# Patient Record
Sex: FEMALE | Race: WHITE | HISPANIC OR LATINO | Employment: FULL TIME | URBAN - METROPOLITAN AREA
[De-identification: names, ages, dates, MRNs, and addresses within clinical notes are randomized per-mention and may not be internally consistent; named-entity substitution may affect disease eponyms.]

---

## 2017-09-17 ENCOUNTER — HOSPITAL ENCOUNTER (EMERGENCY)
Facility: HOSPITAL | Age: 27
Discharge: HOME | End: 2017-09-17
Attending: EMERGENCY MEDICINE | Admitting: EMERGENCY MEDICINE
Payer: COMMERCIAL

## 2017-09-17 ENCOUNTER — APPOINTMENT (EMERGENCY)
Dept: CT IMAGING | Facility: HOSPITAL | Age: 27
End: 2017-09-17
Payer: COMMERCIAL

## 2017-09-17 VITALS
DIASTOLIC BLOOD PRESSURE: 66 MMHG | OXYGEN SATURATION: 100 % | WEIGHT: 144 LBS | HEART RATE: 44 BPM | TEMPERATURE: 97.4 F | SYSTOLIC BLOOD PRESSURE: 120 MMHG | RESPIRATION RATE: 14 BRPM

## 2017-09-17 DIAGNOSIS — N83.202 LEFT OVARIAN CYST: ICD-10-CM

## 2017-09-17 DIAGNOSIS — R10.32 LLQ PAIN: Primary | ICD-10-CM

## 2017-09-17 LAB
ALBUMIN SERPL BCP-MCNC: 3.7 G/DL (ref 3.5–5)
ALP SERPL-CCNC: 51 U/L (ref 46–116)
ALT SERPL W P-5'-P-CCNC: 25 U/L (ref 12–78)
ANION GAP SERPL CALCULATED.3IONS-SCNC: 5 MMOL/L (ref 4–13)
AST SERPL W P-5'-P-CCNC: 21 U/L (ref 5–45)
BACTERIA UR QL AUTO: ABNORMAL /HPF
BASOPHILS # BLD AUTO: 0.02 THOUSANDS/ΜL (ref 0–0.1)
BASOPHILS NFR BLD AUTO: 0 % (ref 0–1)
BILIRUB SERPL-MCNC: 1.16 MG/DL (ref 0.2–1)
BILIRUB UR QL STRIP: NEGATIVE
BUN SERPL-MCNC: 11 MG/DL (ref 5–25)
CALCIUM SERPL-MCNC: 8.8 MG/DL (ref 8.3–10.1)
CHLORIDE SERPL-SCNC: 104 MMOL/L (ref 100–108)
CLARITY UR: CLEAR
CO2 SERPL-SCNC: 30 MMOL/L (ref 21–32)
COLOR UR: YELLOW
COLOR, POC: YELLOW
CREAT SERPL-MCNC: 0.81 MG/DL (ref 0.6–1.3)
EOSINOPHIL # BLD AUTO: 0.06 THOUSAND/ΜL (ref 0–0.61)
EOSINOPHIL NFR BLD AUTO: 1 % (ref 0–6)
ERYTHROCYTE [DISTWIDTH] IN BLOOD BY AUTOMATED COUNT: 12.4 % (ref 11.6–15.1)
EXT PREG TEST URINE: NORMAL
GFR SERPL CREATININE-BSD FRML MDRD: 98 ML/MIN/1.73SQ M
GLUCOSE SERPL-MCNC: 69 MG/DL (ref 65–140)
GLUCOSE UR STRIP-MCNC: NEGATIVE MG/DL
HCT VFR BLD AUTO: 36.7 % (ref 34.8–46.1)
HGB BLD-MCNC: 12.4 G/DL (ref 11.5–15.4)
HGB UR QL STRIP.AUTO: ABNORMAL
KETONES UR STRIP-MCNC: NEGATIVE MG/DL
LEUKOCYTE ESTERASE UR QL STRIP: NEGATIVE
LIPASE SERPL-CCNC: 175 U/L (ref 73–393)
LYMPHOCYTES # BLD AUTO: 1.32 THOUSANDS/ΜL (ref 0.6–4.47)
LYMPHOCYTES NFR BLD AUTO: 19 % (ref 14–44)
MCH RBC QN AUTO: 33.8 PG (ref 26.8–34.3)
MCHC RBC AUTO-ENTMCNC: 33.8 G/DL (ref 31.4–37.4)
MCV RBC AUTO: 100 FL (ref 82–98)
MONOCYTES # BLD AUTO: 0.47 THOUSAND/ΜL (ref 0.17–1.22)
MONOCYTES NFR BLD AUTO: 7 % (ref 4–12)
NEUTROPHILS # BLD AUTO: 4.96 THOUSANDS/ΜL (ref 1.85–7.62)
NEUTS SEG NFR BLD AUTO: 73 % (ref 43–75)
NITRITE UR QL STRIP: NEGATIVE
NON-SQ EPI CELLS URNS QL MICRO: ABNORMAL /HPF
NRBC BLD AUTO-RTO: 0 /100 WBCS
PH UR STRIP.AUTO: 6 [PH] (ref 4.5–8)
PLATELET # BLD AUTO: 189 THOUSANDS/UL (ref 149–390)
PMV BLD AUTO: 10 FL (ref 8.9–12.7)
POTASSIUM SERPL-SCNC: 3.9 MMOL/L (ref 3.5–5.3)
PROT SERPL-MCNC: 7 G/DL (ref 6.4–8.2)
PROT UR STRIP-MCNC: NEGATIVE MG/DL
RBC # BLD AUTO: 3.67 MILLION/UL (ref 3.81–5.12)
RBC #/AREA URNS AUTO: ABNORMAL /HPF
SODIUM SERPL-SCNC: 139 MMOL/L (ref 136–145)
SP GR UR STRIP.AUTO: 1.02 (ref 1–1.03)
UROBILINOGEN UR QL STRIP.AUTO: 0.2 E.U./DL
WBC # BLD AUTO: 6.83 THOUSAND/UL (ref 4.31–10.16)
WBC #/AREA URNS AUTO: ABNORMAL /HPF

## 2017-09-17 PROCEDURE — 96361 HYDRATE IV INFUSION ADD-ON: CPT

## 2017-09-17 PROCEDURE — 81025 URINE PREGNANCY TEST: CPT | Performed by: EMERGENCY MEDICINE

## 2017-09-17 PROCEDURE — 81002 URINALYSIS NONAUTO W/O SCOPE: CPT | Performed by: EMERGENCY MEDICINE

## 2017-09-17 PROCEDURE — 81001 URINALYSIS AUTO W/SCOPE: CPT

## 2017-09-17 PROCEDURE — 83690 ASSAY OF LIPASE: CPT | Performed by: FAMILY MEDICINE

## 2017-09-17 PROCEDURE — 74177 CT ABD & PELVIS W/CONTRAST: CPT

## 2017-09-17 PROCEDURE — 36415 COLL VENOUS BLD VENIPUNCTURE: CPT | Performed by: FAMILY MEDICINE

## 2017-09-17 PROCEDURE — 80053 COMPREHEN METABOLIC PANEL: CPT | Performed by: FAMILY MEDICINE

## 2017-09-17 PROCEDURE — 99284 EMERGENCY DEPT VISIT MOD MDM: CPT

## 2017-09-17 PROCEDURE — 85025 COMPLETE CBC W/AUTO DIFF WBC: CPT | Performed by: FAMILY MEDICINE

## 2017-09-17 PROCEDURE — 96360 HYDRATION IV INFUSION INIT: CPT

## 2017-09-17 RX ADMIN — SODIUM CHLORIDE 1000 ML: 0.9 INJECTION, SOLUTION INTRAVENOUS at 09:29

## 2017-09-17 RX ADMIN — IOHEXOL 100 ML: 350 INJECTION, SOLUTION INTRAVENOUS at 10:09

## 2018-03-13 ENCOUNTER — OFFICE VISIT (OUTPATIENT)
Dept: OBGYN CLINIC | Facility: CLINIC | Age: 28
End: 2018-03-13
Payer: COMMERCIAL

## 2018-03-13 VITALS
DIASTOLIC BLOOD PRESSURE: 82 MMHG | WEIGHT: 153 LBS | SYSTOLIC BLOOD PRESSURE: 118 MMHG | BODY MASS INDEX: 24.59 KG/M2 | HEIGHT: 66 IN

## 2018-03-13 DIAGNOSIS — N83.202 CYST OF LEFT OVARY: ICD-10-CM

## 2018-03-13 DIAGNOSIS — Z01.419 ENCOUNTER FOR ANNUAL ROUTINE GYNECOLOGICAL EXAMINATION: Primary | ICD-10-CM

## 2018-03-13 PROCEDURE — G0145 SCR C/V CYTO,THINLAYER,RESCR: HCPCS | Performed by: OBSTETRICS & GYNECOLOGY

## 2018-03-13 PROCEDURE — 99385 PREV VISIT NEW AGE 18-39: CPT | Performed by: OBSTETRICS & GYNECOLOGY

## 2018-03-13 RX ORDER — METHOCARBAMOL 500 MG/1
TABLET, FILM COATED ORAL
Refills: 1 | COMMUNITY
Start: 2018-03-02

## 2018-03-13 RX ORDER — METRONIDAZOLE 500 MG/1
1 TABLET ORAL 2 TIMES DAILY
COMMUNITY
Start: 2016-07-01

## 2018-03-13 RX ORDER — IBUPROFEN 800 MG/1
TABLET ORAL
Refills: 1 | COMMUNITY
Start: 2018-03-02

## 2018-03-13 NOTE — PROGRESS NOTES
Assessment/Plan:    Pap smear done as well as annual   Encouraged self-breast examination as well as calcium supplementation  Recommend pelvic ultrasound follow-up ovarian cyst   She will call for these results in 1-2 weeks  Discussed treatment options to improve menstrual cycle/dysmenorrhea  At this point patient would like to just monitor her cycles in remain off any type of hormone  She will return to office in 1 year  No problem-specific Assessment & Plan notes found for this encounter  Diagnoses and all orders for this visit:    Encounter for annual routine gynecological examination    Cyst of left ovary  -     US pelvis complete w transvaginal    Other orders  -     ibuprofen (MOTRIN) 800 mg tablet; TAKE 1 TABLET (800 MG TOTAL) BY MOUTH EVERY 6 (SIX) HOURS AS NEEDED FOR MILD PAIN (PAIN SCORE 1-3)  -     methocarbamol (ROBAXIN) 500 mg tablet; TAKE 1 TABLET (500 MG TOTAL) BY MOUTH 4 (FOUR) TIMES A DAY AS NEEDED FOR MUSCLE SPASMS  -     metroNIDAZOLE (FLAGYL) 500 mg tablet; Take 1 tablet by mouth 2 (two) times a day          Subjective:      Patient ID: Rogelio Hector is a 32 y o  female  HPI    This is a 66-year-old female G0 who presents as a new patient for annual well visit  She states her menstrual cycles are every 3-4 weeks lasting 5 days with no breakthrough bleeding  She does suffer cramping  She has been seen by gyn on a regular basis  She was seen last summer in the emergency room complaining of abdominal pain  She underwent a CT scan was informed that she had an ovarian cyst with recommendations to follow up with gynecology  She has delayed this due to having multiple doctors appointments  Patient is sexually active and has been in a monogamous relationship for the last 6 months  She does have a female partner as she has had for the last 2 years  Patient admits to being incarcerated for 2 years in [place]    She has now been living in [place], her home state in the last 1 and half years  She does smoke a half a pack of cigarettes per day  She does work full-time in a warehouse  The following portions of the patient's history were reviewed and updated as appropriate: allergies, current medications, past family history, past medical history, past social history, past surgical history and problem list     Review of Systems   Constitutional: Negative for fatigue, fever and unexpected weight change  Respiratory: Negative for cough, chest tightness, shortness of breath and wheezing  Cardiovascular: Negative  Negative for chest pain and palpitations  Gastrointestinal: Negative  Negative for abdominal distention, abdominal pain, blood in stool, constipation, diarrhea, nausea and vomiting  Genitourinary: Negative  Negative for difficulty urinating, dyspareunia, dysuria, flank pain, frequency, genital sores, hematuria, pelvic pain, urgency, vaginal bleeding, vaginal discharge and vaginal pain  Skin: Negative for rash  Objective:      /82   Ht 5' 6" (1 676 m)   Wt 69 4 kg (153 lb)   LMP 03/01/2018 (Exact Date)   Breastfeeding? No   BMI 24 69 kg/m²          Physical Exam   Constitutional: She appears well-developed and well-nourished  Cardiovascular: Normal rate and regular rhythm  Pulmonary/Chest: Effort normal and breath sounds normal  Right breast exhibits no inverted nipple, no mass, no nipple discharge, no skin change and no tenderness  Left breast exhibits no inverted nipple, no mass, no nipple discharge, no skin change and no tenderness  Abdominal: Soft  Bowel sounds are normal  She exhibits no distension  There is no tenderness  There is no rebound and no guarding  Genitourinary: Vagina normal and uterus normal  There is no lesion on the right labia  There is no lesion on the left labia  Cervix exhibits no discharge and no friability  Right adnexum displays no mass, no tenderness and no fullness   Left adnexum displays no mass, no tenderness and no fullness  No vaginal discharge found

## 2018-03-17 LAB
LAB AP GYN PRIMARY INTERPRETATION: NORMAL
LAB AP LMP: NORMAL
Lab: NORMAL

## 2018-12-17 ENCOUNTER — HOSPITAL ENCOUNTER (EMERGENCY)
Facility: HOSPITAL | Age: 28
Discharge: HOME | End: 2018-12-17
Attending: EMERGENCY MEDICINE | Admitting: EMERGENCY MEDICINE
Payer: COMMERCIAL

## 2018-12-17 ENCOUNTER — APPOINTMENT (EMERGENCY)
Dept: CT IMAGING | Facility: HOSPITAL | Age: 28
End: 2018-12-17
Payer: COMMERCIAL

## 2018-12-17 VITALS
BODY MASS INDEX: 26.15 KG/M2 | SYSTOLIC BLOOD PRESSURE: 139 MMHG | OXYGEN SATURATION: 99 % | HEART RATE: 78 BPM | TEMPERATURE: 97.9 F | RESPIRATION RATE: 20 BRPM | WEIGHT: 162 LBS | DIASTOLIC BLOOD PRESSURE: 58 MMHG

## 2018-12-17 DIAGNOSIS — N94.9 ADNEXAL CYST: Primary | ICD-10-CM

## 2018-12-17 LAB
ALBUMIN SERPL BCP-MCNC: 4 G/DL (ref 3.5–5)
ALP SERPL-CCNC: 72 U/L (ref 46–116)
ALT SERPL W P-5'-P-CCNC: 24 U/L (ref 12–78)
ANION GAP SERPL CALCULATED.3IONS-SCNC: 10 MMOL/L (ref 4–13)
AST SERPL W P-5'-P-CCNC: 16 U/L (ref 5–45)
BACTERIA UR QL AUTO: ABNORMAL /HPF
BASOPHILS # BLD AUTO: 0.04 THOUSANDS/ΜL (ref 0–0.1)
BASOPHILS NFR BLD AUTO: 1 % (ref 0–1)
BILIRUB SERPL-MCNC: 0.7 MG/DL (ref 0.2–1)
BILIRUB UR QL STRIP: NEGATIVE
BUN SERPL-MCNC: 11 MG/DL (ref 5–25)
CALCIUM SERPL-MCNC: 9.2 MG/DL (ref 8.3–10.1)
CHLORIDE SERPL-SCNC: 104 MMOL/L (ref 100–108)
CLARITY UR: CLEAR
CO2 SERPL-SCNC: 27 MMOL/L (ref 21–32)
COLOR UR: YELLOW
CREAT SERPL-MCNC: 0.93 MG/DL (ref 0.6–1.3)
EOSINOPHIL # BLD AUTO: 0.06 THOUSAND/ΜL (ref 0–0.61)
EOSINOPHIL NFR BLD AUTO: 1 % (ref 0–6)
ERYTHROCYTE [DISTWIDTH] IN BLOOD BY AUTOMATED COUNT: 11.8 % (ref 11.6–15.1)
EXT PREG TEST URINE: NEGATIVE
GFR SERPL CREATININE-BSD FRML MDRD: 83 ML/MIN/1.73SQ M
GLUCOSE SERPL-MCNC: 83 MG/DL (ref 65–140)
GLUCOSE UR STRIP-MCNC: NEGATIVE MG/DL
HCT VFR BLD AUTO: 37 % (ref 34.8–46.1)
HGB BLD-MCNC: 12.2 G/DL (ref 11.5–15.4)
HGB UR QL STRIP.AUTO: ABNORMAL
IMM GRANULOCYTES # BLD AUTO: 0.02 THOUSAND/UL (ref 0–0.2)
IMM GRANULOCYTES NFR BLD AUTO: 0 % (ref 0–2)
KETONES UR STRIP-MCNC: NEGATIVE MG/DL
LEUKOCYTE ESTERASE UR QL STRIP: NEGATIVE
LIPASE SERPL-CCNC: 145 U/L (ref 73–393)
LYMPHOCYTES # BLD AUTO: 1.62 THOUSANDS/ΜL (ref 0.6–4.47)
LYMPHOCYTES NFR BLD AUTO: 23 % (ref 14–44)
MCH RBC QN AUTO: 33.6 PG (ref 26.8–34.3)
MCHC RBC AUTO-ENTMCNC: 33 G/DL (ref 31.4–37.4)
MCV RBC AUTO: 102 FL (ref 82–98)
MONOCYTES # BLD AUTO: 0.65 THOUSAND/ΜL (ref 0.17–1.22)
MONOCYTES NFR BLD AUTO: 9 % (ref 4–12)
NEUTROPHILS # BLD AUTO: 4.65 THOUSANDS/ΜL (ref 1.85–7.62)
NEUTS SEG NFR BLD AUTO: 66 % (ref 43–75)
NITRITE UR QL STRIP: NEGATIVE
NON-SQ EPI CELLS URNS QL MICRO: ABNORMAL /HPF
NRBC BLD AUTO-RTO: 0 /100 WBCS
PH UR STRIP.AUTO: 7 [PH] (ref 4.5–8)
PLATELET # BLD AUTO: 209 THOUSANDS/UL (ref 149–390)
PMV BLD AUTO: 9.9 FL (ref 8.9–12.7)
POTASSIUM SERPL-SCNC: 3.7 MMOL/L (ref 3.5–5.3)
PROT SERPL-MCNC: 7.4 G/DL (ref 6.4–8.2)
PROT UR STRIP-MCNC: NEGATIVE MG/DL
RBC # BLD AUTO: 3.63 MILLION/UL (ref 3.81–5.12)
RBC #/AREA URNS AUTO: ABNORMAL /HPF
SODIUM SERPL-SCNC: 141 MMOL/L (ref 136–145)
SP GR UR STRIP.AUTO: 1.01 (ref 1–1.03)
UROBILINOGEN UR QL STRIP.AUTO: 0.2 E.U./DL
WBC # BLD AUTO: 7.04 THOUSAND/UL (ref 4.31–10.16)
WBC #/AREA URNS AUTO: ABNORMAL /HPF

## 2018-12-17 PROCEDURE — 96361 HYDRATE IV INFUSION ADD-ON: CPT

## 2018-12-17 PROCEDURE — 81002 URINALYSIS NONAUTO W/O SCOPE: CPT | Performed by: EMERGENCY MEDICINE

## 2018-12-17 PROCEDURE — 96374 THER/PROPH/DIAG INJ IV PUSH: CPT

## 2018-12-17 PROCEDURE — 85025 COMPLETE CBC W/AUTO DIFF WBC: CPT | Performed by: EMERGENCY MEDICINE

## 2018-12-17 PROCEDURE — 36415 COLL VENOUS BLD VENIPUNCTURE: CPT | Performed by: EMERGENCY MEDICINE

## 2018-12-17 PROCEDURE — 93005 ELECTROCARDIOGRAM TRACING: CPT

## 2018-12-17 PROCEDURE — 99284 EMERGENCY DEPT VISIT MOD MDM: CPT

## 2018-12-17 PROCEDURE — 80053 COMPREHEN METABOLIC PANEL: CPT | Performed by: EMERGENCY MEDICINE

## 2018-12-17 PROCEDURE — 96375 TX/PRO/DX INJ NEW DRUG ADDON: CPT

## 2018-12-17 PROCEDURE — 81001 URINALYSIS AUTO W/SCOPE: CPT

## 2018-12-17 PROCEDURE — 74177 CT ABD & PELVIS W/CONTRAST: CPT

## 2018-12-17 PROCEDURE — 83690 ASSAY OF LIPASE: CPT | Performed by: EMERGENCY MEDICINE

## 2018-12-17 PROCEDURE — 81025 URINE PREGNANCY TEST: CPT | Performed by: EMERGENCY MEDICINE

## 2018-12-17 RX ORDER — NAPROXEN 500 MG/1
500 TABLET ORAL 2 TIMES DAILY WITH MEALS
Qty: 20 TABLET | Refills: 0 | Status: SHIPPED | OUTPATIENT
Start: 2018-12-17

## 2018-12-17 RX ORDER — ONDANSETRON 2 MG/ML
4 INJECTION INTRAMUSCULAR; INTRAVENOUS ONCE
Status: COMPLETED | OUTPATIENT
Start: 2018-12-17 | End: 2018-12-17

## 2018-12-17 RX ORDER — KETOROLAC TROMETHAMINE 30 MG/ML
30 INJECTION, SOLUTION INTRAMUSCULAR; INTRAVENOUS ONCE
Status: COMPLETED | OUTPATIENT
Start: 2018-12-17 | End: 2018-12-17

## 2018-12-17 RX ADMIN — SODIUM CHLORIDE 500 ML: 0.9 INJECTION, SOLUTION INTRAVENOUS at 17:19

## 2018-12-17 RX ADMIN — IOHEXOL 100 ML: 350 INJECTION, SOLUTION INTRAVENOUS at 18:50

## 2018-12-17 RX ADMIN — ONDANSETRON 4 MG: 2 INJECTION INTRAMUSCULAR; INTRAVENOUS at 17:19

## 2018-12-17 RX ADMIN — KETOROLAC TROMETHAMINE 30 MG: 30 INJECTION, SOLUTION INTRAMUSCULAR at 17:22

## 2018-12-17 NOTE — ED NOTES
Patient transported back from [facility] [address], [address] [facility], RN  12/17/18 2963

## 2018-12-17 NOTE — ED PROVIDER NOTES
History  Chief Complaint   Patient presents with    Abdominal Pain     patient c/o of abdominal pain, lower back pain and chest pain that started 4 days ago; patient states that she started taking antibiotics and symptoms started have adminstration     C/o abdominal pain for 4 days, constant pain  No fevers, +nausea, no v/d  Last bm this am   Appetite is less  No dysuria  No hematuria  LMP 2 weeks ago  No previous abd  Surgery  She was started on penicillin 4 days ago for a dental infection  Prior to Admission Medications   Prescriptions Last Dose Informant Patient Reported? Taking?   ibuprofen (MOTRIN) 800 mg tablet   Yes No   Sig: TAKE 1 TABLET (800 MG TOTAL) BY MOUTH EVERY 6 (SIX) HOURS AS NEEDED FOR MILD PAIN (PAIN SCORE 1-3)  methocarbamol (ROBAXIN) 500 mg tablet   Yes No   Sig: TAKE 1 TABLET (500 MG TOTAL) BY MOUTH 4 (FOUR) TIMES A DAY AS NEEDED FOR MUSCLE SPASMS  metroNIDAZOLE (FLAGYL) 500 mg tablet   Yes No   Sig: Take 1 tablet by mouth 2 (two) times a day      Facility-Administered Medications: None       Past Medical History:   Diagnosis Date    ADHD (attention deficit hyperactivity disorder)     Asthma        Past Surgical History:   Procedure Laterality Date    WISDOM TOOTH EXTRACTION         Family History   Problem Relation Age of Onset    Emphysema Mother     Dementia Mother     Lung cancer Mother     Hepatitis Father         C     Diabetes Family         due to underlying condition with diabetic mononeuropathy    Diabetes type II Family         without complication    Breast cancer Paternal Grandmother      I have reviewed and agree with the history as documented      Social History   Substance Use Topics    Smoking status: Current Every Day Smoker     Packs/day: 0 50     Types: Cigarettes    Smokeless tobacco: Never Used      Comment: per allscripts - former smoker     Alcohol use Yes      Comment: social        Review of Systems   Constitutional: Negative for appetite change, fatigue and fever  HENT: Negative for rhinorrhea and sore throat  Respiratory: Negative for cough, shortness of breath and wheezing  Cardiovascular: Negative for chest pain and leg swelling  Gastrointestinal: Positive for abdominal pain and nausea  Negative for diarrhea and vomiting  Genitourinary: Negative for dysuria and flank pain  Musculoskeletal: Negative for back pain and neck pain  Skin: Negative for rash  Neurological: Negative for syncope and headaches  Psychiatric/Behavioral:        Mood normal       Physical Exam  Physical Exam   Constitutional: She is oriented to person, place, and time  She appears well-developed and well-nourished  HENT:   Head: Normocephalic and atraumatic  Neck: Normal range of motion  Neck supple  Cardiovascular: Normal rate and regular rhythm  Pulmonary/Chest: Effort normal and breath sounds normal    Abdominal: Soft  Mild lower abd  Tenderness, no r/g   Musculoskeletal: Normal range of motion  Neurological: She is alert and oriented to person, place, and time  Skin: Skin is warm and dry  Nursing note and vitals reviewed        Vital Signs  ED Triage Vitals   Temperature Pulse Respirations Blood Pressure SpO2   12/17/18 1609 12/17/18 1609 12/17/18 1609 12/17/18 1609 12/17/18 1609   97 9 °F (36 6 °C) 95 18 (!) 151/101 98 %      Temp Source Heart Rate Source Patient Position - Orthostatic VS BP Location FiO2 (%)   12/17/18 1609 12/17/18 1609 12/17/18 1609 12/17/18 1609 --   Temporal Monitor Sitting Left arm       Pain Score       12/17/18 1722       7           Vitals:    12/17/18 1609 12/17/18 1724   BP: (!) 151/101 139/58   Pulse: 95 78   Patient Position - Orthostatic VS: Sitting Lying       Visual Acuity      ED Medications  Medications   sodium chloride 0 9 % bolus 500 mL (0 mL Intravenous Stopped 12/17/18 1940)   ondansetron (ZOFRAN) injection 4 mg (4 mg Intravenous Given 12/17/18 1719)   ketorolac (TORADOL) injection 30 mg (30 mg Intravenous Given 12/17/18 1722)   iohexol (OMNIPAQUE) 350 MG/ML injection (SINGLE-DOSE) 100 mL (100 mL Intravenous Given 12/17/18 1850)       Diagnostic Studies  Results Reviewed     Procedure Component Value Units Date/Time    Comprehensive metabolic panel [[id number]] Collected:  12/17/18 1719    Lab Status:  Final result Specimen:  Blood from Arm, Right Updated:  12/17/18 1800     Sodium 141 mmol/L      Potassium 3 7 mmol/L      Chloride 104 mmol/L      CO2 27 mmol/L      ANION GAP 10 mmol/L      BUN 11 mg/dL      Creatinine 0 93 mg/dL      Glucose 83 mg/dL      Calcium 9 2 mg/dL      AST 16 U/L      ALT 24 U/L      Alkaline Phosphatase 72 U/L      Total Protein 7 4 g/dL      Albumin 4 0 g/dL      Total Bilirubin 0 70 mg/dL      eGFR 83 ml/min/1 73sq m     Narrative:         National Kidney Disease Education Program recommendations are as follows:  GFR calculation is accurate only with a steady state creatinine  Chronic Kidney disease less than 60 ml/min/1 73 sq  meters  Kidney failure less than 15 ml/min/1 73 sq  meters      Lipase [[id number]]  (Normal) Collected:  12/17/18 1719    Lab Status:  Final result Specimen:  Blood from Arm, Right Updated:  12/17/18 1800     Lipase 145 u/L     CBC and differential [[id number]]  (Abnormal) Collected:  12/17/18 1719    Lab Status:  Final result Specimen:  Blood from Arm, Right Updated:  12/17/18 1738     WBC 7 04 Thousand/uL      RBC 3 63 (L) Million/uL      Hemoglobin 12 2 g/dL      Hematocrit 37 0 %       (H) fL      MCH 33 6 pg      MCHC 33 0 g/dL      RDW 11 8 %      MPV 9 9 fL      Platelets 922 Thousands/uL      nRBC 0 /100 WBCs      Neutrophils Relative 66 %      Immat GRANS % 0 %      Lymphocytes Relative 23 %      Monocytes Relative 9 %      Eosinophils Relative 1 %      Basophils Relative 1 %      Neutrophils Absolute 4 65 Thousands/µL      Immature Grans Absolute 0 02 Thousand/uL      Lymphocytes Absolute 1 62 Thousands/µL      Monocytes Absolute 0 65 Thousand/µL      Eosinophils Absolute 0 06 Thousand/µL      Basophils Absolute 0 04 Thousands/µL     Urine Microscopic [[id number]]  (Abnormal) Collected:  12/17/18 1731    Lab Status:  Final result Specimen:  Urine from Urine, Clean Catch Updated:  12/17/18 1733     RBC, UA 2-4 (A) /hpf      WBC, UA 0-1 (A) /hpf      Epithelial Cells Moderate (A) /hpf      Bacteria, UA Occasional /hpf     POCT pregnancy, urine [[id number]]  (Normal) Resulted:  12/17/18 1722    Lab Status:  Final result Updated:  12/17/18 1722     EXT PREG TEST UR (Ref: Negative) Negative    POCT urinalysis dipstick [[id number]]  (Abnormal) Resulted:  12/17/18 1722    Lab Status:  Final result Specimen:  Urine Updated:  12/17/18 1722    ED Urine Macroscopic [[id number]]  (Abnormal) Collected:  12/17/18 1731    Lab Status:  Final result Specimen:  Urine Updated:  12/17/18 1716     Color, UA Yellow     Clarity, UA Clear     pH, UA 7 0     Leukocytes, UA Negative     Nitrite, UA Negative     Protein, UA Negative mg/dl      Glucose, UA Negative mg/dl      Ketones, UA Negative mg/dl      Urobilinogen, UA 0 2 E U /dl      Bilirubin, UA Negative     Blood, UA Moderate (A)     Specific [place], UA 1 015    Narrative:       CLINITEK RESULT                 CT abdomen pelvis with contrast   Final Result by Elizabeth Tovar MD (12/17 1900)      No acute findings in the abdomen or pelvis  4 3 cm right adnexal cyst in keeping with a hemorrhagic cyst/follicle              Workstation performed: [id number]                    Procedures  Procedures       Phone Contacts  ED Phone Contact    ED Course                               MDM  CritCare Time    Disposition  Final diagnoses:   Adnexal cyst     Time reflects when diagnosis was documented in both MDM as applicable and the Disposition within this note     Time User Action Codes Description Comment    12/17/2018  7:33 PM Pili Cabrera Add [N94 9] Adnexal cyst       ED Disposition     ED Disposition Condition Comment    Discharge  Danisha Hickman discharge to home/self care  Condition at discharge: Stable        Follow-up Information     Follow up With Specialties Details Why Contact Info Additional [address] [address] [facility]   [address]  [id number]  [facility], [address] [place] [address], [phone]          Discharge Medication List as of 12/17/2018  7:34 PM      START taking these medications    Details   naproxen (NAPROSYN) 500 mg tablet Take 1 tablet (500 mg total) by mouth 2 (two) times a day with meals, Starting Mon 12/17/2018, Print         CONTINUE these medications which have NOT CHANGED    Details   ibuprofen (MOTRIN) 800 mg tablet TAKE 1 TABLET (800 MG TOTAL) BY MOUTH EVERY 6 (SIX) HOURS AS NEEDED FOR MILD PAIN (PAIN SCORE 1-3)  , Historical Med      methocarbamol (ROBAXIN) 500 mg tablet TAKE 1 TABLET (500 MG TOTAL) BY MOUTH 4 (FOUR) TIMES A DAY AS NEEDED FOR MUSCLE SPASMS , Historical Med      metroNIDAZOLE (FLAGYL) 500 mg tablet Take 1 tablet by mouth 2 (two) times a day, Starting Fri 7/1/2016, Historical Med           No discharge procedures on file      ED Provider  Electronically Signed by           Jose Aaron MD  12/18/18 8713

## 2018-12-17 NOTE — ED NOTES
Pt states she thinks abd pain is related to her taking PCN post dental procedure        Liborio Sullivan, RN  12/17/18 4459

## 2018-12-18 LAB
ATRIAL RATE: 82 BPM
P AXIS: 79 DEGREES
PR INTERVAL: 144 MS
QRS AXIS: 75 DEGREES
QRSD INTERVAL: 82 MS
QT INTERVAL: 392 MS
QTC INTERVAL: 457 MS
T WAVE AXIS: 63 DEGREES
VENTRICULAR RATE: 82 BPM

## 2018-12-18 PROCEDURE — 93010 ELECTROCARDIOGRAM REPORT: CPT | Performed by: INTERNAL MEDICINE

## 2018-12-18 NOTE — DISCHARGE INSTRUCTIONS
Ovarian Cyst   WHAT YOU NEED TO KNOW:   An ovarian cyst is a sac that grows on an ovary  This sac usually contains fluid, but may sometimes have blood or tissue in it  Most ovarian cysts are harmless and go away without treatment in a few months  Some cysts can grow large, cause pain, or break open  DISCHARGE INSTRUCTIONS:   Call 911 for any of the following:   · You are too weak or dizzy to stand up  Return to the emergency department if:   · You have severe abdominal pain  The pain may be sharp and sudden  · You have a fever  Contact your healthcare provider if:   · Your periods are early, late, or more painful than usual     · You have bleeding from your vagina that is not your period  · You have abdominal pain all the time  · Your abdomen is swollen  · You have feelings of fullness, pressure, or discomfort in your abdomen  · You have trouble urinating or emptying your bladder completely  · You have pain during sex  · You are losing weight without trying  · You have questions or concerns about your condition or care  Medicines: You may need any of the following:  · NSAIDs , such as ibuprofen, help decrease swelling, pain, and fever  This medicine is available with or without a doctor's order  NSAIDs can cause stomach bleeding or kidney problems in certain people  If you take blood thinner medicine, always ask if NSAIDs are safe for you  Always read the medicine label and follow directions  Do not give these medicines to children under 10months of age without direction from your child's healthcare provider  · Birth control pills  may help to control your periods, prevent cysts, or cause them to shrink  · Take your medicine as directed  Contact your healthcare provider if you think your medicine is not helping or if you have side effects  Tell him or her if you are allergic to any medicine  Keep a list of the medicines, vitamins, and herbs you take   Include the amounts, and when and why you take them  Bring the list or the pill bottles to follow-up visits  Carry your medicine list with you in case of an emergency  Follow up with your healthcare provider as directed:  Write down your questions so you remember to ask them during your visits  Apply heat to decrease pain and cramping:  Sit in a warm bath, or place a heating pad (turned on low) or a hot water bottle on your abdomen  Do this for 15 to 20 minutes every hour for as many days as directed  © 2017 [address] [employer] Information is for End User's use only and may not be sold, redistributed or otherwise used for commercial purposes  All illustrations and images included in CareNotes® are the copyrighted property of A D A M , Inc  or Frank Dhaliwal  The above information is an  only  It is not intended as medical advice for individual conditions or treatments  Talk to your doctor, nurse or pharmacist before following any medical regimen to see if it is safe and effective for you

## 2019-06-06 ENCOUNTER — OFFICE VISIT (OUTPATIENT)
Dept: OBGYN CLINIC | Facility: HOSPITAL | Age: 29
End: 2019-06-06
Payer: OTHER MISCELLANEOUS

## 2019-06-06 ENCOUNTER — HOSPITAL ENCOUNTER (OUTPATIENT)
Dept: RADIOLOGY | Facility: HOSPITAL | Age: 29
Discharge: HOME | End: 2019-06-06
Payer: COMMERCIAL

## 2019-06-06 VITALS
WEIGHT: 163 LBS | SYSTOLIC BLOOD PRESSURE: 138 MMHG | HEIGHT: 66 IN | DIASTOLIC BLOOD PRESSURE: 78 MMHG | BODY MASS INDEX: 26.2 KG/M2 | HEART RATE: 69 BPM

## 2019-06-06 DIAGNOSIS — M54.12 RADICULOPATHY, CERVICAL REGION: ICD-10-CM

## 2019-06-06 DIAGNOSIS — M54.2 NECK PAIN: Primary | ICD-10-CM

## 2019-06-06 DIAGNOSIS — M54.2 NECK PAIN: ICD-10-CM

## 2019-06-06 PROCEDURE — 72050 X-RAY EXAM NECK SPINE 4/5VWS: CPT

## 2019-06-06 PROCEDURE — 99203 OFFICE O/P NEW LOW 30 MIN: CPT | Performed by: PHYSICIAN ASSISTANT

## 2019-06-06 RX ORDER — METHYLPREDNISOLONE 4 MG/1
TABLET ORAL
Qty: 21 TABLET | Refills: 0 | Status: SHIPPED | OUTPATIENT
Start: 2019-06-06

## 2019-06-06 RX ORDER — CYCLOBENZAPRINE HCL 5 MG
5 TABLET ORAL
Qty: 30 TABLET | Refills: 0 | Status: SHIPPED | OUTPATIENT
Start: 2019-06-06

## 2019-06-06 NOTE — LETTER
June 6, 2019     Patient: Vitaliy Mcdonald   YOB: 1990   Date of Visit: 6/6/2019       To Whom it May Concern:    Vasquez Shell is under my professional care  She was seen in my office on 6/6/2019 at 4:30  She may return to work on 6/7/19 with full activities as tolerated  If you have any questions or concerns, please don't hesitate to call  Sincerely,          Brennan Evans PA-C        CC: Nydia Chu

## 2019-06-06 NOTE — LETTER
June 6, 2019     Patient: Bonnie Mayfield   YOB: 1990   Date of Visit: 6/6/2019       To Whom it May Concern:    Chelsey Yousif is under my professional care  She was seen in my office on 6/6/2019  She may return to work on 6/7/19  she can do full duty activities as tolerated  If you have any questions or concerns, please don't hesitate to call  Sincerely,          Kimberly Sharp PA-C        CC: Nette Espana

## 2019-07-03 NOTE — PROGRESS NOTES
25 y o  female presenting to the office for evaluation of neck pain after injury occurring at work about a week or so ago  Patient states that she has pain in the shoulder and the elbow and limitations in her motion secondary to pain  She denies any constant tingling in her fingers  She states that she has weakness secondary to pain  She denies any other associated complaints  ROS  Review of Systems   Constitutional: Negative for fever and unexpected weight change  HENT: Negative for hearing loss, nosebleeds and sore throat  Eyes: Negative for pain, redness and visual disturbance  Respiratory: Negative for cough, shortness of breath and wheezing  Cardiovascular: Negative for chest pain, palpitations and leg swelling  Gastrointestinal: Negative for abdominal pain, nausea and vomiting  Endocrine: Negative for polydipsia and polyuria  Genitourinary: Negative for dysuria and hematuria  Skin: Negative for rash and wound  Neurological: Negative for dizziness and headaches  Psychiatric/Behavioral: Negative for agitation and suicidal ideas  Past Medical History:   Diagnosis Date    ADHD (attention deficit hyperactivity disorder)     Asthma      Past Surgical History:   Procedure Laterality Date    WISDOM TOOTH EXTRACTION       Results Reviewed     None          Physical Exam  Physical Exam   Constitutional: She is oriented to person, place, and time  She appears well-developed and well-nourished  HENT:   Head: Normocephalic and atraumatic  Eyes: Pupils are equal, round, and reactive to light  EOM are normal    Neck: Neck supple  No tracheal deviation present  Cardiovascular: Normal rate and regular rhythm  Pulmonary/Chest: Effort normal and breath sounds normal    Abdominal: There is no guarding  Neurological: She is alert and oriented to person, place, and time  Skin: Skin is warm and dry  Psychiatric: She has a normal mood and affect   Her behavior is normal      Back Exam     Tenderness   The patient is experiencing tenderness in the cervical     Range of Motion   Extension: abnormal   Flexion: abnormal   Lateral bend right: abnormal   Lateral bend left: abnormal   Rotation right: abnormal   Rotation left: abnormal     Muscle Strength   The patient has normal back strength  Other   Sensation: normal            Imaging  I personally reviewed these images :  Patient does not appear to have any acute injuries on the her cervical spine x-ray, however, she does appear to have significant muscle spasms    Procedures  None    Assessment/Plan  25 y o  female    1  Neck pain  - XR spine cervical complete 4 or 5 vw non injury; Future    2  Radiculopathy, cervical region  - methylPREDNISolone 4 MG tablet therapy pack; Use as directed on package  Dispense: 21 tablet; Refill: 0  - cyclobenzaprine (FLEXERIL) 5 mg tablet; Take 1 tablet (5 mg total) by mouth daily at bedtime  Dispense: 30 tablet; Refill: 0  - Ambulatory referral to Physical Therapy; Future  - follow-up in 6 weeks for repeat evaluation  At that time would consider MRI if symptoms have not improved

## 2023-06-24 ENCOUNTER — APPOINTMENT (EMERGENCY)
Dept: RADIOLOGY | Facility: HOSPITAL | Age: 33
End: 2023-06-24
Payer: MEDICAID

## 2023-06-24 ENCOUNTER — HOSPITAL ENCOUNTER (EMERGENCY)
Facility: HOSPITAL | Age: 33
Discharge: HOME | End: 2023-06-24
Attending: EMERGENCY MEDICINE
Payer: MEDICAID

## 2023-06-24 VITALS
HEIGHT: 66 IN | SYSTOLIC BLOOD PRESSURE: 127 MMHG | BODY MASS INDEX: 26.71 KG/M2 | DIASTOLIC BLOOD PRESSURE: 59 MMHG | OXYGEN SATURATION: 100 % | TEMPERATURE: 97.8 F | WEIGHT: 166.23 LBS | HEART RATE: 110 BPM | RESPIRATION RATE: 19 BRPM

## 2023-06-24 DIAGNOSIS — S61.412A LACERATION OF LEFT HAND WITHOUT FOREIGN BODY, INITIAL ENCOUNTER: Primary | ICD-10-CM

## 2023-06-24 DIAGNOSIS — Z23 NEED FOR TETANUS BOOSTER: ICD-10-CM

## 2023-06-24 DIAGNOSIS — Z23 TETANUS-DIPHTHERIA VACCINATION ADMINISTERED AT CURRENT VISIT: ICD-10-CM

## 2023-06-24 PROCEDURE — 73130 X-RAY EXAM OF HAND: CPT

## 2023-06-24 PROCEDURE — 90471 IMMUNIZATION ADMIN: CPT

## 2023-06-24 PROCEDURE — 90715 TDAP VACCINE 7 YRS/> IM: CPT

## 2023-06-24 PROCEDURE — 96372 THER/PROPH/DIAG INJ SC/IM: CPT

## 2023-06-24 PROCEDURE — 99282 EMERGENCY DEPT VISIT SF MDM: CPT

## 2023-06-24 RX ORDER — KETOROLAC TROMETHAMINE 30 MG/ML
15 INJECTION, SOLUTION INTRAMUSCULAR; INTRAVENOUS ONCE
Status: COMPLETED | OUTPATIENT
Start: 2023-06-24 | End: 2023-06-24

## 2023-06-24 RX ORDER — KETOROLAC TROMETHAMINE 30 MG/ML
15 INJECTION, SOLUTION INTRAMUSCULAR; INTRAVENOUS ONCE
Status: DISCONTINUED | OUTPATIENT
Start: 2023-06-24 | End: 2023-06-24

## 2023-06-24 RX ORDER — LIDOCAINE HYDROCHLORIDE 10 MG/ML
10 INJECTION, SOLUTION EPIDURAL; INFILTRATION; INTRACAUDAL; PERINEURAL ONCE
Status: COMPLETED | OUTPATIENT
Start: 2023-06-24 | End: 2023-06-24

## 2023-06-24 RX ORDER — ACETAMINOPHEN 325 MG/1
975 TABLET ORAL ONCE
Status: COMPLETED | OUTPATIENT
Start: 2023-06-24 | End: 2023-06-24

## 2023-06-24 RX ORDER — FENTANYL CITRATE 50 UG/ML
25 INJECTION, SOLUTION INTRAMUSCULAR; INTRAVENOUS ONCE
Status: COMPLETED | OUTPATIENT
Start: 2023-06-24 | End: 2023-06-24

## 2023-06-24 RX ADMIN — LIDOCAINE HYDROCHLORIDE 10 ML: 10 INJECTION, SOLUTION EPIDURAL; INFILTRATION; INTRACAUDAL; PERINEURAL at 11:05

## 2023-06-24 RX ADMIN — FENTANYL CITRATE 25 MCG: 50 INJECTION INTRAMUSCULAR; INTRAVENOUS at 10:42

## 2023-06-24 RX ADMIN — KETOROLAC TROMETHAMINE 15 MG: 30 INJECTION, SOLUTION INTRAMUSCULAR at 10:40

## 2023-06-24 RX ADMIN — TETANUS TOXOID, REDUCED DIPHTHERIA TOXOID AND ACELLULAR PERTUSSIS VACCINE, ADSORBED 0.5 ML: 5; 2.5; 8; 8; 2.5 SUSPENSION INTRAMUSCULAR at 10:37

## 2023-06-24 RX ADMIN — ACETAMINOPHEN 975 MG: 325 TABLET, FILM COATED ORAL at 10:36

## 2023-06-24 NOTE — ED PROVIDER NOTES
History  Chief Complaint   Patient presents with   • Extremity Laceration     Pt presents to the ED after pinching L hand between a pipe and washing machine  Unsure of last known tetanus shot     20-year-old right handed female with no significant past medical history presents with crush injury to hand  Patient states that she was moving a washing machine when it fell onto her left hand crushing her hypothenar region  Patient states that she was stuck under the washing machine for some time due to way of the machine, pain, no assistance from her 22-year-old mother  Patient states bleeding controlled, full range of motion, no numbness, no tingling, no pallor  Unsure of last tetanus  Finished menses a couple days ago  States noted laceration over medial aspect of hypothenar region  Prior to Admission Medications   Prescriptions Last Dose Informant Patient Reported? Taking? cyclobenzaprine (FLEXERIL) 5 mg tablet   No No   Sig: Take 1 tablet (5 mg total) by mouth daily at bedtime   ibuprofen (MOTRIN) 800 mg tablet   Yes No   Sig: TAKE 1 TABLET (800 MG TOTAL) BY MOUTH EVERY 6 (SIX) HOURS AS NEEDED FOR MILD PAIN (PAIN SCORE 1-3)  methocarbamol (ROBAXIN) 500 mg tablet   Yes No   Sig: TAKE 1 TABLET (500 MG TOTAL) BY MOUTH 4 (FOUR) TIMES A DAY AS NEEDED FOR MUSCLE SPASMS     methylPREDNISolone 4 MG tablet therapy pack   No No   Sig: Use as directed on package   metroNIDAZOLE (FLAGYL) 500 mg tablet   Yes No   Sig: Take 1 tablet by mouth 2 (two) times a day   naproxen (NAPROSYN) 500 mg tablet   No No   Sig: Take 1 tablet (500 mg total) by mouth 2 (two) times a day with meals      Facility-Administered Medications: None       Past Medical History:   Diagnosis Date   • ADHD (attention deficit hyperactivity disorder)    • Asthma        Past Surgical History:   Procedure Laterality Date   • WISDOM TOOTH EXTRACTION         Family History   Problem Relation Age of Onset   • Emphysema Mother    • Dementia Mother • Lung cancer Mother    • Hepatitis Father         C    • Diabetes Family         due to underlying condition with diabetic mononeuropathy   • Diabetes type II Family         without complication   • Breast cancer Paternal Grandmother      I have reviewed and agree with the history as documented  E-Cigarette/Vaping     E-Cigarette/Vaping Substances     Social History     Tobacco Use   • Smoking status: Every Day     Packs/day: 0 50     Types: Cigarettes   • Smokeless tobacco: Never   • Tobacco comments:     per allscripts - former smoker    Substance Use Topics   • Alcohol use: Yes     Comment: social   • Drug use: Yes     Types: Marijuana, Cocaine     Comment: last used 1 5 weeks ago        Review of Systems   Constitutional: Negative for chills, diaphoresis, fatigue and fever  Musculoskeletal: Negative for arthralgias and joint swelling  Skin: Negative for color change and pallor  Neurological: Negative for weakness and numbness  Hematological: Does not bruise/bleed easily  Physical Exam  ED Triage Vitals [06/24/23 1003]   Temperature Pulse Respirations Blood Pressure SpO2   97 8 °F (36 6 °C) (!) 110 19 127/59 100 %      Temp Source Heart Rate Source Patient Position - Orthostatic VS BP Location FiO2 (%)   Oral Monitor Sitting Left arm --      Pain Score       8             Orthostatic Vital Signs  Vitals:    06/24/23 1003   BP: 127/59   Pulse: (!) 110   Patient Position - Orthostatic VS: Sitting       Physical Exam  Constitutional:       General: She is in acute distress  Appearance: Normal appearance  She is normal weight  She is not ill-appearing, toxic-appearing or diaphoretic  HENT:      Head: Normocephalic and atraumatic  Right Ear: External ear normal       Left Ear: External ear normal       Nose: Nose normal       Mouth/Throat:      Mouth: Mucous membranes are moist    Eyes:      Extraocular Movements: Extraocular movements intact        Conjunctiva/sclera: Conjunctivae normal    Cardiovascular:      Rate and Rhythm: Normal rate and regular rhythm  Pulses: Normal pulses  Pulmonary:      Effort: Pulmonary effort is normal    Musculoskeletal:         General: Tenderness and signs of injury present  No swelling or deformity  Normal range of motion  Cervical back: Neck supple  Right lower leg: No edema  Left lower leg: No edema  Comments: Flexion and extension is fully intact at the DIPs, PIPs, MCPs, and IP joints of the left hand  Flexion, extension, and lateral movements of the left wrist are intact  Opposition of the left thumb is intact  Pt is able to resist adduction and abduction of the fingers of the left hand  Skin:     General: Skin is warm and dry  Capillary Refill: Capillary refill takes less than 2 seconds  Coloration: Skin is not jaundiced or pale  Findings: Laceration present  No abrasion, abscess, acne, bruising, burn, ecchymosis, erythema, lesion, petechiae, rash or wound  Comments: 6cm laceration w/ maceration of the tissue over the medial ulnar surface of the left hypothenar region   Neurological:      General: No focal deficit present  Mental Status: She is alert and oriented to person, place, and time  Mental status is at baseline        Gait: Gait normal    Psychiatric:         Mood and Affect: Mood normal          Behavior: Behavior normal          ED Medications  Medications   tetanus-diphtheria-acellular pertussis (BOOSTRIX) IM injection 0 5 mL (0 5 mL Intramuscular Given 6/24/23 1037)   fentanyl citrate (PF) 100 MCG/2ML 25 mcg (25 mcg Intramuscular Given 6/24/23 1042)   acetaminophen (TYLENOL) tablet 975 mg (975 mg Oral Given 6/24/23 1036)   ketorolac (TORADOL) injection 15 mg (15 mg Intramuscular Given 6/24/23 1040)   lidocaine (PF) (XYLOCAINE-MPF) 1 % injection 10 mL (10 mL Infiltration Given by Other 6/24/23 1105)       Diagnostic Studies  Results Reviewed     None                 XR hand 3+ views LEFT ED Interpretation by Caridad Li MD (06/24 1043)   No acute bony abnormality  Final Result by Renato Tellez MD (06/24 1946)      No acute osseous abnormality  Workstation performed: [id number]               Procedures  Universal Protocol:  Procedure performed by:  Consent: Verbal consent obtained  Risks and benefits: risks, benefits and alternatives were discussed  Consent given by: patient  Patient understanding: patient states understanding of the procedure being performed  Patient consent: the patient's understanding of the procedure matches consent given  Patient identity confirmed: verbally with patient    Laceration repair    Date/Time: 6/24/2023 11:30 AM    Performed by: Caridad Li MD  Authorized by: Caridad Li MD  Body area: upper extremity (Left hypothenar region)  Laceration length: 6 cm  Foreign bodies: no foreign bodies  Tendon involvement: none  Nerve involvement: none  Vascular damage: no  Anesthesia: nerve block and local infiltration (Ulnar block)    Anesthesia:  Local Anesthetic: bupivacaine 0 5% without epinephrine  Anesthetic total: 7 mL    Sedation:  Patient sedated: no        Procedure Details:  Irrigation solution: saline  Irrigation method: jet lavage  Amount of cleaning: standard  Debridement: minimal  Degree of undermining: none  Skin closure: 4-0 Prolene  Number of sutures: 6  Technique: simple  Approximation: close  Approximation difficulty: complex  Dressing: 4x4 sterile gauze  Patient tolerance: patient tolerated the procedure well with no immediate complications            ED Course                                       Medical Decision Making  26 yo right handed female presents w/ laceration to left hypothenar region  Unknown last tetanus  Neurvascularly intact  Compartments are soft and nontender   Full ROM of the fingers and wrist    Ddx includes laceration, crush injury, fx  Tetanus shot given  No bony abnormalities on imaging  Laceration repaired w/o difficulty or complications  6 sutures placed  Education given on how to take care of sutures and prevent scarring  Strict return precautions given for s/s of infection, neurovascular compromise, compartment syndrome  Advised to f/u w/ pcp, urgent care, or ED for suture removal in 10 days  Patient understanding and in agreement w/ plan  Amount and/or Complexity of Data Reviewed  Radiology: ordered and independent interpretation performed  Risk  OTC drugs  Prescription drug management  Disposition  Final diagnoses:   Laceration of left hand without foreign body, initial encounter   Need for tetanus booster   Tetanus-diphtheria vaccination administered at current visit     Time reflects when diagnosis was documented in both MDM as applicable and the Disposition within this note     Time User Action Codes Description Comment    6/24/2023 11:59 AM Sterling Maxwell Add [U95 860C] Laceration of deep palmar arch of left hand, initial encounter     6/24/2023 11:59 AM Agatha Maxwell Remove [H60 666D] Laceration of deep palmar arch of left hand, initial encounter     6/24/2023 12:00 PM Agatha Maxwell Add [E53 265F] Laceration of left hand without foreign body, initial encounter     6/25/2023 11:50 AM Agatha Maxwell Add [Z23] Need for tetanus booster     6/25/2023 11:52 AM Agatha Maxwell Add [Z23] Tetanus-diphtheria vaccination administered at current visit       ED Disposition     ED Disposition   Discharge    Condition   Stable    Date/Time   Sat Jun 24, 2023 [facility] discharge to home/self care                 Follow-up Information     Follow up With Specialties Details Why Contact Info Additional Information    Janak Alejandro PA-C Physician Assistant Schedule an appointment as soon as possible for a visit in 1 week  [address] [facility] [id number]  [address] Emergency Department Emergency Medicine Go to  As needed, If symptoms worsen [address] [facility] [address] [facility], [address], [place], [place], [address]          Discharge Medication List as of 6/24/2023 12:01 PM      CONTINUE these medications which have NOT CHANGED    Details   cyclobenzaprine (FLEXERIL) 5 mg tablet Take 1 tablet (5 mg total) by mouth daily at bedtime, Starting u 6/6/2019, Normal      ibuprofen (MOTRIN) 800 mg tablet TAKE 1 TABLET (800 MG TOTAL) BY MOUTH EVERY 6 (SIX) HOURS AS NEEDED FOR MILD PAIN (PAIN SCORE 1-3)  , Historical Med      methocarbamol (ROBAXIN) 500 mg tablet TAKE 1 TABLET (500 MG TOTAL) BY MOUTH 4 (FOUR) TIMES A DAY AS NEEDED FOR MUSCLE SPASMS , Historical Med      methylPREDNISolone 4 MG tablet therapy pack Use as directed on package, Normal      metroNIDAZOLE (FLAGYL) 500 mg tablet Take 1 tablet by mouth 2 (two) times a day, Starting Fri 7/1/2016, Historical Med      naproxen (NAPROSYN) 500 mg tablet Take 1 tablet (500 mg total) by mouth 2 (two) times a day with meals, Starting Mon 12/17/2018, Print           No discharge procedures on file  PDMP Review     None           ED Provider  Attending physically available and evaluated Remona Spurling  HARPER managed the patient along with the ED Attending      Electronically Signed by         Lelon Cranker, MD  06/25/23 7700

## 2023-06-24 NOTE — DISCHARGE INSTRUCTIONS
-Do not get sutures wet for the first 24 hours  -You may apply antibiotic ointment for the first 2 days but after that there should be a scab developing so leave it clean, dry, and covered  -After 24 hours, you may wash with warm soap and water once daily     -Follow-up with family doctor or urgent care for suture removal in 7-10 days   -To prevent scarring, avoid direct sunlight or apply sunscreen to the wound after it has healed       -Return to the ER if the affected area becomes red, hot, swollen, has any pus-like discharge or you develop any fevers   -6 sutures were placed today

## 2023-06-24 NOTE — ED ATTENDING ATTESTATION
6/24/2023  IMitchell MD, saw and evaluated the patient  I have discussed the patient with the resident/non-physician practitioner and agree with the resident's/non-physician practitioner's findings, Plan of Care, and MDM as documented in the resident's/non-physician practitioner's note, except where noted  All available labs and Radiology studies were reviewed  I was present for key portions of any procedure(s) performed by the resident/non-physician practitioner and I was immediately available to provide assistance  At this point I agree with the current assessment done in the Emergency Department  I have conducted an independent evaluation of this patient a history and physical is as follows:    56-year-old female presenting for evaluation of a laceration to the ulnar aspect of her left hand that occurred when the patient's hand was stuck between her washing machine and a metal pipe  Tetanus status is not up-to-date  Denies numbness, tingling, or weakness in the fingers of the left hand  No other areas of injury  On exam the patient has 2+ distal pulses with good capillary refill  She has an approximately 3 cm laceration over the ulnar aspect of the left fifth MCP down to subcutaneous tissue  Full range of motion of the fingers of the left hand at the MCP, DIP, and PIP joints  Intact sensation to light touch in the radial, median, and ulnar nerve distributions of the left hand  ED Course  ED Course as of 06/24/23 1137   Sat Jun 24, 2023   1134 X-ray of the left hand with no acute fracture or malalignment  Patient has a laceration down to subcutaneous tissue to the ulnar aspect of the left hand over the ulnar aspect of the fifth MCP  Hand is neurovascularly intact with good capillary refill and good distal pulses  No evidence of compartment syndrome     [address] Nery was anesthetized via ulnar nerve block by resident physician under ultrasound guidance with myself at bedside for the entirety of the procedure  Following procedure patient continues to have normal motor function of the left hand but with decreased sensation over the ulnar aspect of the left hand  Laceration was repaired by resident physician  No evidence of superinfection  Tetanus status updated  I discussed reasons to return to the emergency department with the patient at bedside as well as laceration care           Critical Care Time  Procedures

## 2023-07-10 ENCOUNTER — OFFICE VISIT (OUTPATIENT)
Dept: URGENT CARE | Age: 33
End: 2023-07-10
Payer: MEDICAID

## 2023-07-10 VITALS
HEART RATE: 101 BPM | DIASTOLIC BLOOD PRESSURE: 93 MMHG | OXYGEN SATURATION: 99 % | SYSTOLIC BLOOD PRESSURE: 145 MMHG | TEMPERATURE: 97.1 F | RESPIRATION RATE: 20 BRPM

## 2023-07-10 DIAGNOSIS — S61.412A LACERATION OF LEFT HAND WITHOUT FOREIGN BODY, INITIAL ENCOUNTER: Primary | ICD-10-CM

## 2023-07-10 PROCEDURE — G0382 LEV 3 HOSP TYPE B ED VISIT: HCPCS | Performed by: NURSE PRACTITIONER

## 2023-07-10 PROCEDURE — 99283 EMERGENCY DEPT VISIT LOW MDM: CPT | Performed by: NURSE PRACTITIONER

## 2023-07-10 RX ORDER — AMOXICILLIN AND CLAVULANATE POTASSIUM 875; 125 MG/1; MG/1
1 TABLET, FILM COATED ORAL EVERY 12 HOURS SCHEDULED
Qty: 14 TABLET | Refills: 0 | Status: SHIPPED | OUTPATIENT
Start: 2023-07-10 | End: 2023-07-17

## 2023-07-10 NOTE — PROGRESS NOTES
[facility]        NAME: Gunner Plaza is a 27 y.o. female  : 1990    MRN: [id number]  DATE: July 10, 2023  TIME: 7:30 PM    Assessment and Plan   Laceration of left hand without foreign body, initial encounter [S61.412A]  1. Laceration of left hand without foreign body, initial encounter  amoxicillin-clavulanate (AUGMENTIN) 875-125 mg per tablet            Patient Instructions     She declined taking out the sutures  Says she will go to the ED for removal  Follow up with PCP in 3-5 days. Proceed to  ER if symptoms worsen. Chief Complaint     Chief Complaint   Patient presents with   • Suture / Staple Removal     Left hand  swollen and suture removal it's been 16 days. History of Present Illness       HPI   Reports she has sutures on the left hand that was put in 16 days ago. Says it is swollen and painful. Review of Systems   Review of Systems   Constitutional: Negative for fever. Skin: Positive for color change (red) and wound (left hand, with sutures). Neurological: Negative for numbness. Current Medications       Current Outpatient Medications:   •  amoxicillin-clavulanate (AUGMENTIN) 875-125 mg per tablet, Take 1 tablet by mouth every 12 (twelve) hours for 7 days, Disp: 14 tablet, Rfl: 0  •  cyclobenzaprine (FLEXERIL) 5 mg tablet, Take 1 tablet (5 mg total) by mouth daily at bedtime, Disp: 30 tablet, Rfl: 0  •  ibuprofen (MOTRIN) 800 mg tablet, TAKE 1 TABLET (800 MG TOTAL) BY MOUTH EVERY 6 (SIX) HOURS AS NEEDED FOR MILD PAIN (PAIN SCORE 1-3). , Disp: , Rfl: 1  •  methocarbamol (ROBAXIN) 500 mg tablet, TAKE 1 TABLET (500 MG TOTAL) BY MOUTH 4 (FOUR) TIMES A DAY AS NEEDED FOR MUSCLE SPASMS., Disp: , Rfl: 1  •  methylPREDNISolone 4 MG tablet therapy pack, Use as directed on package, Disp: 21 tablet, Rfl: 0  •  metroNIDAZOLE (FLAGYL) 500 mg tablet, Take 1 tablet by mouth 2 (two) times a day, Disp: , Rfl:   •  naproxen (NAPROSYN) 500 mg tablet, Take 1 tablet (500 mg total) by mouth 2 (two) times a day with meals, Disp: 20 tablet, Rfl: 0    Current Allergies     Allergies as of 07/10/2023 - Reviewed 07/10/2023   Allergen Reaction Noted   • Tegretol [carbamazepine] Hives, Anaphylaxis, and Itching 03/07/2016   • Doxycycline Hives 07/13/2016            The following portions of the patient's history were reviewed and updated as appropriate: allergies, current medications, past family history, past medical history, past social history, past surgical history and problem list.     Past Medical History:   Diagnosis Date   • ADHD (attention deficit hyperactivity disorder)    • Asthma        Past Surgical History:   Procedure Laterality Date   • WISDOM TOOTH EXTRACTION         Family History   Problem Relation Age of Onset   • Emphysema Mother    • Dementia Mother    • Lung cancer Mother    • Hepatitis Father         C    • Diabetes Family         due to underlying condition with diabetic mononeuropathy   • Diabetes type II Family         without complication   • Breast cancer Paternal Grandmother          Medications have been verified. Objective   /93   Pulse 101   Temp (!) 97.1 °F (36.2 °C) (Temporal)   Resp 20   LMP 07/10/2023 Comment: on her period now  SpO2 99%   Patient's last menstrual period was 07/10/2023. Physical Exam     Physical Exam  Skin:     Capillary Refill: Capillary refill takes less than 2 seconds. Findings: Erythema present. Comments: There is a laceration on the left hand, with six sutures. The surrounding skin is erythematous. Mild to moderate swelling.  Wound bed is wet

## 2023-07-14 ENCOUNTER — APPOINTMENT (EMERGENCY)
Dept: RADIOLOGY | Facility: HOSPITAL | Age: 33
End: 2023-07-14
Payer: MEDICAID

## 2023-07-14 ENCOUNTER — APPOINTMENT (EMERGENCY)
Dept: CT IMAGING | Facility: HOSPITAL | Age: 33
End: 2023-07-14
Payer: MEDICAID

## 2023-07-14 ENCOUNTER — HOSPITAL ENCOUNTER (EMERGENCY)
Facility: HOSPITAL | Age: 33
Discharge: HOME | End: 2023-07-14
Attending: EMERGENCY MEDICINE
Payer: MEDICAID

## 2023-07-14 VITALS
OXYGEN SATURATION: 99 % | BODY MASS INDEX: 26.75 KG/M2 | WEIGHT: 166.45 LBS | RESPIRATION RATE: 16 BRPM | SYSTOLIC BLOOD PRESSURE: 129 MMHG | DIASTOLIC BLOOD PRESSURE: 81 MMHG | HEART RATE: 73 BPM | TEMPERATURE: 98.4 F | HEIGHT: 66 IN

## 2023-07-14 DIAGNOSIS — G47.19 EXCESSIVE DAYTIME SLEEPINESS: Primary | ICD-10-CM

## 2023-07-14 DIAGNOSIS — J38.1 VOCAL CORD POLYPS: ICD-10-CM

## 2023-07-14 LAB
ALBUMIN SERPL BCP-MCNC: 4 G/DL (ref 3.5–5)
ALP SERPL-CCNC: 61 U/L (ref 34–104)
ALT SERPL W P-5'-P-CCNC: 12 U/L (ref 7–52)
ANION GAP SERPL CALCULATED.3IONS-SCNC: 7 MMOL/L
AST SERPL W P-5'-P-CCNC: 16 U/L (ref 13–39)
BASOPHILS # BLD AUTO: 0.04 THOUSANDS/ÂΜL (ref 0–0.1)
BASOPHILS NFR BLD AUTO: 1 % (ref 0–1)
BILIRUB SERPL-MCNC: 0.66 MG/DL (ref 0.2–1)
BUN SERPL-MCNC: 11 MG/DL (ref 5–25)
CALCIUM SERPL-MCNC: 8.9 MG/DL (ref 8.4–10.2)
CHLORIDE SERPL-SCNC: 107 MMOL/L (ref 96–108)
CO2 SERPL-SCNC: 23 MMOL/L (ref 21–32)
CREAT SERPL-MCNC: 0.91 MG/DL (ref 0.6–1.3)
EOSINOPHIL # BLD AUTO: 0.03 THOUSAND/ÂΜL (ref 0–0.61)
EOSINOPHIL NFR BLD AUTO: 0 % (ref 0–6)
ERYTHROCYTE [DISTWIDTH] IN BLOOD BY AUTOMATED COUNT: 11.9 % (ref 11.6–15.1)
EXT PREGNANCY TEST URINE: NEGATIVE
EXT. CONTROL: NORMAL
GFR SERPL CREATININE-BSD FRML MDRD: 82 ML/MIN/1.73SQ M
GLUCOSE SERPL-MCNC: 81 MG/DL (ref 65–140)
HCT VFR BLD AUTO: 40.1 % (ref 34.8–46.1)
HGB BLD-MCNC: 12.9 G/DL (ref 11.5–15.4)
IMM GRANULOCYTES # BLD AUTO: 0.02 THOUSAND/UL (ref 0–0.2)
IMM GRANULOCYTES NFR BLD AUTO: 0 % (ref 0–2)
LYMPHOCYTES # BLD AUTO: 1.58 THOUSANDS/ÂΜL (ref 0.6–4.47)
LYMPHOCYTES NFR BLD AUTO: 24 % (ref 14–44)
MAGNESIUM SERPL-MCNC: 2.2 MG/DL (ref 1.9–2.7)
MCH RBC QN AUTO: 31.8 PG (ref 26.8–34.3)
MCHC RBC AUTO-ENTMCNC: 32.2 G/DL (ref 31.4–37.4)
MCV RBC AUTO: 99 FL (ref 82–98)
MONOCYTES # BLD AUTO: 0.46 THOUSAND/ÂΜL (ref 0.17–1.22)
MONOCYTES NFR BLD AUTO: 7 % (ref 4–12)
NEUTROPHILS # BLD AUTO: 4.54 THOUSANDS/ÂΜL (ref 1.85–7.62)
NEUTS SEG NFR BLD AUTO: 68 % (ref 43–75)
NRBC BLD AUTO-RTO: 0 /100 WBCS
PHOSPHATE SERPL-MCNC: 2.7 MG/DL (ref 2.7–4.5)
PLATELET # BLD AUTO: 283 THOUSANDS/UL (ref 149–390)
PMV BLD AUTO: 8.9 FL (ref 8.9–12.7)
POTASSIUM SERPL-SCNC: 4.1 MMOL/L (ref 3.5–5.3)
PROT SERPL-MCNC: 6.7 G/DL (ref 6.4–8.4)
RBC # BLD AUTO: 4.06 MILLION/UL (ref 3.81–5.12)
SODIUM SERPL-SCNC: 137 MMOL/L (ref 135–147)
TSH SERPL DL<=0.05 MIU/L-ACNC: 1.09 UIU/ML (ref 0.45–4.5)
WBC # BLD AUTO: 6.67 THOUSAND/UL (ref 4.31–10.16)

## 2023-07-14 PROCEDURE — 81025 URINE PREGNANCY TEST: CPT | Performed by: EMERGENCY MEDICINE

## 2023-07-14 PROCEDURE — 99284 EMERGENCY DEPT VISIT MOD MDM: CPT

## 2023-07-14 PROCEDURE — 36415 COLL VENOUS BLD VENIPUNCTURE: CPT | Performed by: EMERGENCY MEDICINE

## 2023-07-14 PROCEDURE — G1004 CDSM NDSC: HCPCS

## 2023-07-14 PROCEDURE — 85025 COMPLETE CBC W/AUTO DIFF WBC: CPT | Performed by: EMERGENCY MEDICINE

## 2023-07-14 PROCEDURE — 80053 COMPREHEN METABOLIC PANEL: CPT | Performed by: EMERGENCY MEDICINE

## 2023-07-14 PROCEDURE — 83735 ASSAY OF MAGNESIUM: CPT | Performed by: EMERGENCY MEDICINE

## 2023-07-14 PROCEDURE — 86308 HETEROPHILE ANTIBODY SCREEN: CPT | Performed by: EMERGENCY MEDICINE

## 2023-07-14 PROCEDURE — 70491 CT SOFT TISSUE NECK W/DYE: CPT

## 2023-07-14 PROCEDURE — 86618 LYME DISEASE ANTIBODY: CPT | Performed by: EMERGENCY MEDICINE

## 2023-07-14 PROCEDURE — 84100 ASSAY OF PHOSPHORUS: CPT | Performed by: EMERGENCY MEDICINE

## 2023-07-14 PROCEDURE — 93005 ELECTROCARDIOGRAM TRACING: CPT

## 2023-07-14 PROCEDURE — 71046 X-RAY EXAM CHEST 2 VIEWS: CPT

## 2023-07-14 PROCEDURE — 84443 ASSAY THYROID STIM HORMONE: CPT | Performed by: EMERGENCY MEDICINE

## 2023-07-14 RX ADMIN — IOHEXOL 85 ML: 350 INJECTION, SOLUTION INTRAVENOUS at 15:43

## 2023-07-14 NOTE — ED PROVIDER NOTES
History  Chief Complaint   Patient presents with   • Syncope     Keeps feeling pre-syncope, heavy feeling, anxiety       History provided by:  Patient and medical records   used: No    Syncope  Associated symptoms: shortness of breath    Associated symptoms: no chest pain, no dizziness, no fever, no headaches, no nausea, no palpitations, no vomiting and no weakness    27-year-old female presented for evaluation of possible syncopal episode. She describes it more as falling asleep while talking to her aunt. States she has been feeling really tired, like she is going to pass out or fall asleep almost consistently for the last few weeks, worst the last week or so. She denies any palpitations, chest pain, headaches, visual changes. She states she is sleeping enough each night and eating normally. She is on no medications. Denies any drug or alcohol use. She is a former smoker. She is stridorous in the room which she states is fairly chronic. Per chart review she was noted to have polypoid changes of the vocal cords secondary to smoking 3 to 4 years ago when evaluated by ENT. She has not been reevaluated. She does report feeling hard time breathing laying flat at night and does wake up feeling short of breath. Differential diagnosis includes dehydration, viral illness, Lyme, sleep apnea, electrolyte abnormality, dehydration, pregnancy. Plan labs, urine, soft tissue neck CT, reevaluate. Prior to Admission Medications   Prescriptions Last Dose Informant Patient Reported? Taking?   amoxicillin-clavulanate (AUGMENTIN) 875-125 mg per tablet   No No   Sig: Take 1 tablet by mouth every 12 (twelve) hours for 7 days   cyclobenzaprine (FLEXERIL) 5 mg tablet   No No   Sig: Take 1 tablet (5 mg total) by mouth daily at bedtime   ibuprofen (MOTRIN) 800 mg tablet   Yes No   Sig: TAKE 1 TABLET (800 MG TOTAL) BY MOUTH EVERY 6 (SIX) HOURS AS NEEDED FOR MILD PAIN (PAIN SCORE 1-3).    methocarbamol (ROBAXIN) 500 mg tablet   Yes No   Sig: TAKE 1 TABLET (500 MG TOTAL) BY MOUTH 4 (FOUR) TIMES A DAY AS NEEDED FOR MUSCLE SPASMS. methylPREDNISolone 4 MG tablet therapy pack   No No   Sig: Use as directed on package   metroNIDAZOLE (FLAGYL) 500 mg tablet   Yes No   Sig: Take 1 tablet by mouth 2 (two) times a day   naproxen (NAPROSYN) 500 mg tablet   No No   Sig: Take 1 tablet (500 mg total) by mouth 2 (two) times a day with meals      Facility-Administered Medications: None       Past Medical History:   Diagnosis Date   • ADHD (attention deficit hyperactivity disorder)    • Asthma        Past Surgical History:   Procedure Laterality Date   • WISDOM TOOTH EXTRACTION         Family History   Problem Relation Age of Onset   • Emphysema Mother    • Dementia Mother    • Lung cancer Mother    • Hepatitis Father         C    • Diabetes Family         due to underlying condition with diabetic mononeuropathy   • Diabetes type II Family         without complication   • Breast cancer Paternal Grandmother      I have reviewed and agree with the history as documented. E-Cigarette/Vaping     E-Cigarette/Vaping Substances     Social History     Tobacco Use   • Smoking status: Every Day     Packs/day: 0.50     Types: Cigarettes   • Smokeless tobacco: Never   • Tobacco comments:     per allscripts - former smoker    Substance Use Topics   • Alcohol use: Yes     Comment: social   • Drug use: Yes     Types: Marijuana, Cocaine     Comment: last used 1.5 weeks ago       Review of Systems   Constitutional: Negative for activity change, appetite change, fatigue and fever. HENT: Positive for voice change. Negative for congestion, facial swelling, sinus pressure, sore throat and trouble swallowing. Respiratory: Positive for shortness of breath. Negative for cough and chest tightness. Cardiovascular: Positive for syncope. Negative for chest pain, palpitations and leg swelling.    Gastrointestinal: Negative for abdominal pain, nausea and vomiting. Genitourinary: Negative for difficulty urinating and dysuria. Musculoskeletal: Negative for back pain and neck pain. Skin: Negative for color change, rash and wound. Neurological: Positive for syncope and light-headedness. Negative for dizziness, weakness and headaches. All other systems reviewed and are negative. Physical Exam  Physical Exam  Vitals and nursing note reviewed. Constitutional:       Appearance: Normal appearance. HENT:      Head: Normocephalic and atraumatic. Nose: Nose normal. No congestion. Mouth/Throat:      Mouth: Mucous membranes are moist.      Pharynx: Oropharynx is clear. Eyes:      Conjunctiva/sclera: Conjunctivae normal.      Pupils: Pupils are equal, round, and reactive to light. Neck:      Comments: No goiter. Cardiovascular:      Rate and Rhythm: Normal rate and regular rhythm. Heart sounds: Normal heart sounds. No murmur heard. Pulmonary:      Effort: Pulmonary effort is normal.      Comments: Inspiratory stridor. Hoarse voice. No adventitious lung sounds. Abdominal:      General: There is no distension. Palpations: Abdomen is soft. Musculoskeletal:         General: No swelling. Normal range of motion. Cervical back: Normal range of motion and neck supple. No tenderness. Skin:     General: Skin is warm and dry. Capillary Refill: Capillary refill takes less than 2 seconds. Neurological:      General: No focal deficit present. Mental Status: She is alert and oriented to person, place, and time.    Psychiatric:         Mood and Affect: Mood normal.         Behavior: Behavior normal.         Vital Signs  ED Triage Vitals   Temperature Pulse Respirations Blood Pressure SpO2   07/14/23 1215 07/14/23 1215 07/14/23 1215 07/14/23 1215 07/14/23 1215   98 °F (36.7 °C) 95 18 132/89 100 %      Temp Source Heart Rate Source Patient Position - Orthostatic VS BP Location FiO2 (%)   07/14/23 1225 07/14/23 1225 07/14/23 1215 07/14/23 1215 --   Oral Monitor Sitting Right arm       Pain Score       07/14/23 1215       No Pain           Vitals:    07/14/23 1215 07/14/23 1225 07/14/23 1451   BP: 132/89 133/82 129/81   Pulse: 95 89 73   Patient Position - Orthostatic VS: Sitting Lying Lying         Visual Acuity      ED Medications  Medications   iohexol (OMNIPAQUE) 350 MG/ML injection (SINGLE-DOSE) 85 mL (85 mL Intravenous Given 7/14/23 1543)       Diagnostic Studies  Results Reviewed     Procedure Component Value Units Date/Time    TSH [[id number]]     Lab Status: No result Specimen: Blood     Comprehensive metabolic panel [[id number]] Collected: 07/14/23 1450    Lab Status: Final result Specimen: Blood from Arm, Right Updated: 07/14/23 1527     Sodium 137 mmol/L      Potassium 4.1 mmol/L      Chloride 107 mmol/L      CO2 23 mmol/L      ANION GAP 7 mmol/L      BUN 11 mg/dL      Creatinine 0.91 mg/dL      Glucose 81 mg/dL      Calcium 8.9 mg/dL      AST 16 U/L      ALT 12 U/L      Alkaline Phosphatase 61 U/L      Total Protein 6.7 g/dL      Albumin 4.0 g/dL      Total Bilirubin 0.66 mg/dL      eGFR 82 ml/min/1.73sq m     Narrative:      [place] guidelines for Chronic Kidney Disease (CKD):   •  Stage 1 with normal or high GFR (GFR > 90 mL/min/1.73 square meters)  •  Stage 2 Mild CKD (GFR = 60-89 mL/min/1.73 square meters)  •  Stage 3A Moderate CKD (GFR = 45-59 mL/min/1.73 square meters)  •  Stage 3B Moderate CKD (GFR = 30-44 mL/min/1.73 square meters)  •  Stage 4 Severe CKD (GFR = 15-29 mL/min/1.73 square meters)  •  Stage 5 End Stage CKD (GFR <15 mL/min/1.73 square meters)  Note: GFR calculation is accurate only with a steady state creatinine    Magnesium [[id number]]  (Normal) Collected: 07/14/23 1450    Lab Status: Final result Specimen: Blood from Arm, Right Updated: 07/14/23 1527     Magnesium 2.2 mg/dL     Phosphorus [[id number]]  (Normal) Collected: 07/14/23 1450    Lab Status: Final result Specimen: Blood from Arm, Right Updated: 07/14/23 1527     Phosphorus 2.7 mg/dL     POCT pregnancy, urine [[id number]]  (Normal) Resulted: 07/14/23 1503    Lab Status: Final result Updated: 07/14/23 1503     EXT Preg Test, Ur Negative     Control Valid    CBC and differential [[id number]]  (Abnormal) Collected: 07/14/23 1450    Lab Status: Final result Specimen: Blood from Arm, Right Updated: 07/14/23 1501     WBC 6.67 Thousand/uL      RBC 4.06 Million/uL      Hemoglobin 12.9 g/dL      Hematocrit 40.1 %      MCV 99 fL      MCH 31.8 pg      MCHC 32.2 g/dL      RDW 11.9 %      MPV 8.9 fL      Platelets 611 Thousands/uL      nRBC 0 /100 WBCs      Neutrophils Relative 68 %      Immat GRANS % 0 %      Lymphocytes Relative 24 %      Monocytes Relative 7 %      Eosinophils Relative 0 %      Basophils Relative 1 %      Neutrophils Absolute 4.54 Thousands/µL      Immature Grans Absolute 0.02 Thousand/uL      Lymphocytes Absolute 1.58 Thousands/µL      Monocytes Absolute 0.46 Thousand/µL      Eosinophils Absolute 0.03 Thousand/µL      Basophils Absolute 0.04 Thousands/µL     Mononucleosis screen [[id number]] Collected: 07/14/23 1450    Lab Status: In process Specimen: Blood from Arm, Right Updated: 07/14/23 1457    Lyme Total AB W Reflex to IGM/IGG [[id number]] Collected: 07/14/23 1450    Lab Status: In process Specimen: Blood from Arm, Right Updated: 07/14/23 1457                 CT soft tissue neck   Final Result by Christy Leiva MD (07/14 1614)         1. 3 tiny subcentimeter air lucency structures versus extraluminal air in the right paraglottic and preglottic fat, possibly tiny air-filled laryngocele versus small foci of extraluminal gas of undetermined etiology. No definite mucosal nodule or solid    mass lesion. No collection. Further clinical assessment and clinical follow-up recommended.       Workstation performed: [id number]         XR chest 2 views   Final Result by Charli Linares MD (07/14 1603)      No acute cardiopulmonary disease. Workstation performed: [id number]                    Procedures  ECG 12 Lead Documentation Only    Date/Time: 7/14/2023 2:36 PM    Performed by: Elder Mohamud MD  Authorized by: Elder Mohamud MD    Indications / Diagnosis:  Syncope  ECG reviewed by me, the ED Provider: yes    Patient location:  ED  Previous ECG:     Previous ECG:  Compared to current    Comparison ECG info:  12/17/18    Similarity:  No change  Rate:     ECG rate:  90  Rhythm:     Rhythm: sinus rhythm    Ectopy:     Ectopy: none    QRS:     QRS axis:  Normal  Conduction:     Conduction: normal    ST segments:     ST segments:  Normal  T waves:     T waves: normal               ED Course                                             Medical Decision Making  49-year-old female presented for evaluation of possible syncopal episode. She describes it more as falling asleep while talking to her aunt. States she has been feeling really tired, like she is going to pass out or fall asleep almost consistently for the last few weeks, worst the last week or so. She denies any palpitations, chest pain, shortness of breath, headaches, visual changes. She states she is sleeping enough each night and eating normally. She is on no medications. Denies any drug or alcohol use. She is a former smoker. She is stridorous in the room which she states is fairly chronic, but has been waking up feeling it's hard to breathe. History of polypoid changes of the vocal cords. Her lab work unremarkable. Her TSH, Lyme, mono tests are still pending. Referring to ENT for further evaluation of vocal cord polyps. CT showed nonspecific changes in the area but no other notes of mass or enlarged thyroid, etc.  Also referring for home sleep study. Amount and/or Complexity of Data Reviewed  Labs: ordered. Radiology: ordered. Risk  Prescription drug management.           Disposition  Final diagnoses:   Excessive daytime sleepiness   Vocal cord polyps     Time reflects when diagnosis was documented in both MDM as applicable and the Disposition within this note     Time User Action Codes Description Comment    7/14/2023  4:24 PM Lali Edgar Add [G47.19] Excessive daytime sleepiness     7/14/2023  4:25 PM Lali Edgar Add [J38.1] Vocal cord polyps       ED Disposition     ED Disposition   Discharge    Condition   Stable    Date/Time   Fri Jul 14, 2023  4:26 PM    [address] discharge to home/self care. Follow-up Information     Follow up With Specialties Details Why Contact Info Additional Information    [facility]   [address] [facility] [address] [place] [address]  [phone] [facility] [address] [facility], [address] [place] [address], [id number], [phone]          Patient's Medications   Discharge Prescriptions    No medications on file       Outpatient Discharge Orders   Ambulatory Referral to Otolaryngology   Standing Status: Future Standing Exp. Date: 07/14/24      Home Study   Standing Status: Future Standing Exp.  Date: 07/14/24       PDMP Review     None          ED Provider  Electronically Signed by           Lali Edgar MD  07/14/23 7456

## 2023-07-15 LAB — B BURGDOR IGG+IGM SER QL IA: NEGATIVE

## 2023-07-16 LAB
ATRIAL RATE: 90 BPM
HETEROPH AB SER QL: NEGATIVE
P AXIS: 80 DEGREES
PR INTERVAL: 146 MS
QRS AXIS: 74 DEGREES
QRSD INTERVAL: 82 MS
QT INTERVAL: 386 MS
QTC INTERVAL: 472 MS
T WAVE AXIS: 65 DEGREES
VENTRICULAR RATE: 90 BPM

## 2023-07-16 PROCEDURE — 93010 ELECTROCARDIOGRAM REPORT: CPT | Performed by: INTERNAL MEDICINE

## 2023-10-16 ENCOUNTER — HOSPITAL ENCOUNTER (EMERGENCY)
Facility: HOSPITAL | Age: 33
Discharge: HOME | End: 2023-10-17
Attending: EMERGENCY MEDICINE

## 2023-10-16 VITALS
TEMPERATURE: 98.1 F | HEART RATE: 61 BPM | RESPIRATION RATE: 16 BRPM | SYSTOLIC BLOOD PRESSURE: 100 MMHG | OXYGEN SATURATION: 99 % | DIASTOLIC BLOOD PRESSURE: 61 MMHG

## 2023-10-16 DIAGNOSIS — T78.40XA ACUTE ALLERGIC REACTION, INITIAL ENCOUNTER: Primary | ICD-10-CM

## 2023-10-16 RX ORDER — METHYLPREDNISOLONE SOD SUCC 125 MG
1 VIAL (EA) INJECTION ONCE
Status: COMPLETED | OUTPATIENT
Start: 2023-10-16 | End: 2023-10-16

## 2023-10-17 RX ORDER — FAMOTIDINE 20 MG/1
20 TABLET, FILM COATED ORAL ONCE
Status: DISCONTINUED | OUTPATIENT
Start: 2023-10-17 | End: 2023-10-17

## 2023-10-17 RX ORDER — PREDNISONE 20 MG/1
TABLET ORAL
Qty: 25 TABLET | Refills: 0 | Status: SHIPPED | OUTPATIENT
Start: 2023-10-17 | End: 2023-10-31

## 2023-10-17 RX ORDER — FAMOTIDINE 10 MG/ML
20 INJECTION, SOLUTION INTRAVENOUS ONCE
Status: COMPLETED | OUTPATIENT
Start: 2023-10-17 | End: 2023-10-17

## 2023-10-17 RX ADMIN — FAMOTIDINE 20 MG: 10 INJECTION INTRAVENOUS at 00:29

## 2023-10-17 NOTE — DISCHARGE INSTRUCTIONS
Take the prednisone taper as prescribed. Symptoms are likely to slowly improve over several days. Monitor for difficulty breathing, nausea, vomiting, abdominal cramping. If you develop new or worsening symptoms, please return to the Emergency Department for further evaluation.

## 2023-10-17 NOTE — ED ATTENDING ATTESTATION
10/16/2023  I, Mirna Carbajal MD, saw and evaluated the patient. I have discussed the patient with the resident/non-physician practitioner and agree with the resident's/non-physician practitioner's findings, Plan of Care, and MDM as documented in the resident's/non-physician practitioner's note, except where noted. All available labs and Radiology studies were reviewed. I was present for key portions of any procedure(s) performed by the resident/non-physician practitioner and I was immediately available to provide assistance. At this point I agree with the current assessment done in the Emergency Department. I have conducted an independent evaluation of this patient a history and physical is as follows:    47-year-old female presents via EMS for evaluation of possible allergic reaction. Patient states she was working in the garden yesterday when she started to develop a pruritic rash. Today the rash spread to include her trunk and her face. She denies any associated difficulty breathing or swallowing. No abdominal pain, nausea or vomiting. She received Solu-Medrol and Benadryl prehospital.    On exam, patient was comfortably bed in no acute distress, head is normocephalic atraumatic, pupils equal round reactive to light, neck is supple without meningismus signs, heart is regular rate and rhythm with intact distal pulses, lungs are clear to auscultation bilaterally, no stridor. Abdomen is soft, nontender nondistended without rebound or guarding. Raised erythematous rash noted on the trunk, hands, and face. Exam and history most consistent with acute allergic reaction to poison ivy, no signs of anaphylaxis or angioedema. Patient already received antihistamine and steroids prehospital.  We will monitor clinically and if no change in her condition, discharge home with steroid taper.     ED Course         Critical Care Time  Procedures

## 2023-10-17 NOTE — ED PROVIDER NOTES
History  Chief Complaint   Patient presents with    Allergic Reaction     Pt has rash on her face that started today, says ehs has many allergens but didn't come in contact with any of those, pt also states that she was gardening doesn't know if she came in contact with poison ivy. Pt able to eat and drink swallow things     HPI    Patient is a 75-year-old female presenting via EMS for acute allergic reaction. Per patient, she was gardening yesterday and throughout the day today started with a rash that has been spreading from her trunk now on her face. She called EMS after feeling that her throat was closing. She states that she has an EpiPen at home however did not want to use it. EMS administered 125 mg of Solu-Medrol and 50 mg of IV Benadryl. Patient still complains of itching. Patient denies difficulty breathing, nausea, vomiting, abdominal pain, diarrhea. Patient states she has a history of allergies but does not think that she is ever had anaphylaxis. Prior to Admission Medications   Prescriptions Last Dose Informant Patient Reported? Taking? cyclobenzaprine (FLEXERIL) 5 mg tablet   No No   Sig: Take 1 tablet (5 mg total) by mouth daily at bedtime   ibuprofen (MOTRIN) 800 mg tablet   Yes No   Sig: TAKE 1 TABLET (800 MG TOTAL) BY MOUTH EVERY 6 (SIX) HOURS AS NEEDED FOR MILD PAIN (PAIN SCORE 1-3). methocarbamol (ROBAXIN) 500 mg tablet   Yes No   Sig: TAKE 1 TABLET (500 MG TOTAL) BY MOUTH 4 (FOUR) TIMES A DAY AS NEEDED FOR MUSCLE SPASMS.    methylPREDNISolone 4 MG tablet therapy pack   No No   Sig: Use as directed on package   metroNIDAZOLE (FLAGYL) 500 mg tablet   Yes No   Sig: Take 1 tablet by mouth 2 (two) times a day   naproxen (NAPROSYN) 500 mg tablet   No No   Sig: Take 1 tablet (500 mg total) by mouth 2 (two) times a day with meals      Facility-Administered Medications: None       Past Medical History:   Diagnosis Date    ADHD (attention deficit hyperactivity disorder)     Asthma        Past Surgical History:   Procedure Laterality Date    WISDOM TOOTH EXTRACTION         Family History   Problem Relation Age of Onset    Emphysema Mother     Dementia Mother     Lung cancer Mother     Hepatitis Father         C     Diabetes Family         due to underlying condition with diabetic mononeuropathy    Diabetes type II Family         without complication    Breast cancer Paternal Grandmother      I have reviewed and agree with the history as documented. E-Cigarette/Vaping     E-Cigarette/Vaping Substances     Social History     Tobacco Use    Smoking status: Former     Packs/day: 0.50     Types: Cigarettes    Smokeless tobacco: Never    Tobacco comments:     per allscripts - former smoker    Substance Use Topics    Alcohol use: Yes     Comment: social    Drug use: Yes     Types: Marijuana, Cocaine     Comment: last used 1.5 weeks ago        Review of Systems   Constitutional:  Negative for chills and fever. HENT:  Negative for ear pain and sore throat. Eyes:  Negative for pain and visual disturbance. Respiratory:  Negative for cough and shortness of breath. Cardiovascular:  Negative for chest pain and palpitations. Gastrointestinal:  Negative for abdominal pain and vomiting. Genitourinary:  Negative for dysuria and hematuria. Musculoskeletal:  Negative for arthralgias and back pain. Skin:  Positive for rash. Negative for color change. Neurological:  Negative for seizures and syncope. All other systems reviewed and are negative.       Physical Exam  ED Triage Vitals   Temperature Pulse Respirations Blood Pressure SpO2   10/16/23 2326 10/16/23 2326 10/16/23 2326 10/16/23 2329 10/16/23 2326   98.1 °F (36.7 °C) 60 20 100/61 100 %      Temp Source Heart Rate Source Patient Position - Orthostatic VS BP Location FiO2 (%)   10/16/23 2326 10/16/23 2329 10/16/23 2329 10/16/23 2329 --   Oral Monitor Sitting Right arm       Pain Score       --                    Orthostatic Vital Signs  Vitals: 10/16/23 2326 10/16/23 2329 10/16/23 2330 10/16/23 2345   BP:  100/61 100/61    Pulse: 60 72 72 61   Patient Position - Orthostatic VS:  Sitting         Physical Exam  Vitals and nursing note reviewed. Constitutional:       General: She is not in acute distress. Appearance: She is not ill-appearing. HENT:      Head: Normocephalic and atraumatic. Right Ear: External ear normal.      Left Ear: External ear normal.      Nose: Nose normal.      Mouth/Throat:      Pharynx: Oropharynx is clear. Eyes:      Extraocular Movements: Extraocular movements intact. Pupils: Pupils are equal, round, and reactive to light. Cardiovascular:      Rate and Rhythm: Normal rate and regular rhythm. Pulses: Normal pulses. Heart sounds: Normal heart sounds. No murmur heard. No friction rub. No gallop. Pulmonary:      Effort: Pulmonary effort is normal. No respiratory distress. Breath sounds: Normal breath sounds. No stridor. No wheezing, rhonchi or rales. Abdominal:      General: Abdomen is flat. There is no distension. Palpations: Abdomen is soft. Tenderness: There is no abdominal tenderness. There is no guarding or rebound. Musculoskeletal:         General: No deformity. Normal range of motion. Cervical back: Normal range of motion. Right lower leg: No edema. Left lower leg: No edema. Skin:     General: Skin is warm and dry. Capillary Refill: Capillary refill takes less than 2 seconds. Findings: Rash present. Comments: Patient has raised erythematous rash that is scattered along her right upper quadrant, bilateral hands, neck and left side of face. There is no mucous membrane involvement. There is no oropharyngeal swelling. Neurological:      General: No focal deficit present. Mental Status: She is alert and oriented to person, place, and time.       Gait: Gait normal.   Psychiatric:         Mood and Affect: Mood normal.         ED Medications  Medications   diphenhydrAMINE (FOR EMS ONLY) (BENADRYL) injection 50 mg (0 mg Does not apply Given to EMS 10/16/23 2331)   methylPREDNISolone sodium succinate (FOR EMS ONLY) (Solu-MEDROL) 125 MG injection 125 mg (0 mg Does not apply Given to EMS 10/16/23 2331)   Famotidine (PF) (PEPCID) injection 20 mg (20 mg Intravenous Given 10/17/23 0029)       Diagnostic Studies  Results Reviewed       None                   No orders to display         Procedures  Procedures      ED Course                                       Medical Decision Making  70-year-old female presenting for acute allergic reaction. Vital signs are stable. Patient overall peers well, no stridor, no wheezing, no oropharyngeal swelling. Patient was already administered Benadryl and steroids per EMS. Will monitor in the emergency department for progression of illness. Patient remained stable. Sent with course of prednisone. Discussed return precautions and patient comfortable discharge plan. Risk  Prescription drug management. Disposition  Final diagnoses:   Acute allergic reaction, initial encounter     Time reflects when diagnosis was documented in both MDM as applicable and the Disposition within this note       Time User Action Codes Description Comment    10/17/2023 12:03 AM Paco Diaz Add [T78.40XA] Acute allergic reaction, initial encounter           ED Disposition       ED Disposition   Discharge    Condition   Stable    Date/Time   Tue Oct 17, 2023 12:03 AM    Comment   [address] discharge to home/self care.                    Follow-up Information       Follow up With Specialties Details Why Contact Info Additional [facility] Emergency Department Emergency Medicine  If symptoms worsen [address] [address]  [facility] Emergency Department, [address] [place] [address], [address] [phone]            Patient's Medications   Discharge Prescriptions    PREDNISONE 20 MG TABLET    Take 3 tablets (60 mg total) by mouth daily for 4 days, THEN 2 tablets (40 mg total) daily for 4 days, THEN 1 tablet (20 mg total) daily for 4 days, THEN 0.5 tablets (10 mg total) daily for 2 days. Start Date: 10/17/2023End Date: 10/31/2023       Order Dose: --       Quantity: 25 tablet    Refills: 0     No discharge procedures on file. PDMP Review       None             ED Provider  Attending physically available and evaluated Kg Gallo. I managed the patient along with the ED Attending.     Electronically Signed by           Annelise Baum MD  10/17/23 2482

## 2024-02-07 ENCOUNTER — APPOINTMENT (EMERGENCY)
Dept: RADIOLOGY | Facility: HOSPITAL | Age: 34
End: 2024-02-07
Payer: MEDICARE

## 2024-02-07 ENCOUNTER — HOSPITAL ENCOUNTER (EMERGENCY)
Facility: HOSPITAL | Age: 34
Discharge: HOME | End: 2024-02-08
Attending: EMERGENCY MEDICINE
Payer: MEDICARE

## 2024-02-07 VITALS
TEMPERATURE: 98.4 F | RESPIRATION RATE: 18 BRPM | SYSTOLIC BLOOD PRESSURE: 143 MMHG | HEART RATE: 90 BPM | OXYGEN SATURATION: 98 % | DIASTOLIC BLOOD PRESSURE: 85 MMHG

## 2024-02-07 DIAGNOSIS — J06.9 VIRAL URI WITH COUGH: Primary | ICD-10-CM

## 2024-02-07 PROCEDURE — 93005 ELECTROCARDIOGRAM TRACING: CPT

## 2024-02-07 PROCEDURE — 99283 EMERGENCY DEPT VISIT LOW MDM: CPT

## 2024-02-07 PROCEDURE — 99285 EMERGENCY DEPT VISIT HI MDM: CPT | Performed by: EMERGENCY MEDICINE

## 2024-02-07 PROCEDURE — 81025 URINE PREGNANCY TEST: CPT

## 2024-02-07 RX ORDER — ALBUTEROL SULFATE 90 UG/1
2 AEROSOL, METERED RESPIRATORY (INHALATION) ONCE
Status: COMPLETED | OUTPATIENT
Start: 2024-02-07 | End: 2024-02-07

## 2024-02-07 RX ORDER — IBUPROFEN 400 MG/1
400 TABLET ORAL ONCE
Status: COMPLETED | OUTPATIENT
Start: 2024-02-07 | End: 2024-02-07

## 2024-02-07 RX ORDER — ACETAMINOPHEN 160 MG/5ML
2 SUSPENSION, ORAL (FINAL DOSE FORM) ORAL ONCE
Status: COMPLETED | OUTPATIENT
Start: 2024-02-07 | End: 2024-02-07

## 2024-02-07 RX ADMIN — ALBUTEROL SULFATE 2 PUFF: 90 AEROSOL, METERED RESPIRATORY (INHALATION) at 23:41

## 2024-02-07 RX ADMIN — IBUPROFEN 400 MG: 400 TABLET, FILM COATED ORAL at 23:41

## 2024-02-08 ENCOUNTER — APPOINTMENT (EMERGENCY)
Dept: RADIOLOGY | Facility: HOSPITAL | Age: 34
End: 2024-02-08
Payer: MEDICARE

## 2024-02-08 LAB
ATRIAL RATE: 95 BPM
EXT PREGNANCY TEST URINE: NEGATIVE
EXT. CONTROL: NORMAL
P AXIS: 75 DEGREES
PR INTERVAL: 148 MS
QRS AXIS: 60 DEGREES
QRSD INTERVAL: 90 MS
QT INTERVAL: 378 MS
QTC INTERVAL: 475 MS
T WAVE AXIS: 60 DEGREES
VENTRICULAR RATE: 95 BPM

## 2024-02-08 PROCEDURE — 71046 X-RAY EXAM CHEST 2 VIEWS: CPT

## 2024-02-08 PROCEDURE — 93010 ELECTROCARDIOGRAM REPORT: CPT | Performed by: INTERNAL MEDICINE

## 2024-02-08 NOTE — DISCHARGE INSTRUCTIONS
Your evaluation suggests that your symptoms are due to a non emergent cause, most consistent with a viral URI.    Please follow up with your primary care physician within one week.    Return to the Emergency Department if you experience worsening or concerning symptoms.    Thank you for choosing us for your care.

## 2024-02-08 NOTE — ED PROVIDER NOTES
History  Chief Complaint   Patient presents with    Flu Symptoms     Pt reports worsening body aches, chills, and cough. Thinks she pulled muscle in chest from coughing. Denies sick contacts, no fevers at home     Patient is a 33-year-old female with a significant past medical history of asthma, presenting for evaluation of a cough.  She reports that she has been having a minimally productive cough of a yellow type mucus over the last 3 days or so.  She reports that today, she awoke from her sleep and had a coughing fit which caused her to feel short of breath.  She subsequently called EMS and had some persistent coughing which was followed by an episode of posttussive. She states this episode of vomiting helped her nasal congestion and now she is no longer feeling short of breath.  She does endorse some pharyngitis.  She denies any abdominal pain.  She denies fevers.  She felt slightly lightheaded previously but is no longer feeling that way anymore.  She has a dull, generalized, throbbing headache.  She is unsure of sick contacts.        Prior to Admission Medications   Prescriptions Last Dose Informant Patient Reported? Taking?   cyclobenzaprine (FLEXERIL) 5 mg tablet   No No   Sig: Take 1 tablet (5 mg total) by mouth daily at bedtime   ibuprofen (MOTRIN) 800 mg tablet   Yes No   Sig: TAKE 1 TABLET (800 MG TOTAL) BY MOUTH EVERY 6 (SIX) HOURS AS NEEDED FOR MILD PAIN (PAIN SCORE 1-3).   methocarbamol (ROBAXIN) 500 mg tablet   Yes No   Sig: TAKE 1 TABLET (500 MG TOTAL) BY MOUTH 4 (FOUR) TIMES A DAY AS NEEDED FOR MUSCLE SPASMS.   methylPREDNISolone 4 MG tablet therapy pack   No No   Sig: Use as directed on package   metroNIDAZOLE (FLAGYL) 500 mg tablet   Yes No   Sig: Take 1 tablet by mouth 2 (two) times a day   naproxen (NAPROSYN) 500 mg tablet   No No   Sig: Take 1 tablet (500 mg total) by mouth 2 (two) times a day with meals      Facility-Administered Medications: None       Past Medical History:   Diagnosis Date     ADHD (attention deficit hyperactivity disorder)     Asthma        Past Surgical History:   Procedure Laterality Date    WISDOM TOOTH EXTRACTION         Family History   Problem Relation Age of Onset    Emphysema Mother     Dementia Mother     Lung cancer Mother     Hepatitis Father         C     Diabetes Family         due to underlying condition with diabetic mononeuropathy    Diabetes type II Family         without complication    Breast cancer Paternal Grandmother      I have reviewed and agree with the history as documented.    E-Cigarette/Vaping     E-Cigarette/Vaping Substances     Social History     Tobacco Use    Smoking status: Former     Current packs/day: 0.50     Types: Cigarettes    Smokeless tobacco: Never    Tobacco comments:     per allscripts - former smoker    Substance Use Topics    Alcohol use: Yes     Comment: social    Drug use: Yes     Types: Marijuana, Cocaine     Comment: last used 1.5 weeks ago        Review of Systems   Constitutional:  Negative for fever.   HENT:  Positive for congestion and sore throat.    Respiratory:  Positive for cough and shortness of breath.    Cardiovascular:  Negative for chest pain.   Gastrointestinal:  Positive for vomiting. Negative for abdominal pain, constipation and diarrhea.   Neurological:  Positive for light-headedness and headaches.   All other systems reviewed and are negative.      Physical Exam  ED Triage Vitals [02/07/24 2313]   Temperature Pulse Respirations Blood Pressure SpO2   98.4 °F (36.9 °C) 90 18 143/85 98 %      Temp Source Heart Rate Source Patient Position - Orthostatic VS BP Location FiO2 (%)   Oral Monitor Lying Left arm --      Pain Score       --             Orthostatic Vital Signs  Vitals:    02/07/24 2313   BP: 143/85   Pulse: 90   Patient Position - Orthostatic VS: Lying       Physical Exam  Vitals and nursing note reviewed.   Constitutional:       General: She is not in acute distress.     Appearance: Normal appearance. She is  not ill-appearing or toxic-appearing.   HENT:      Head: Normocephalic and atraumatic.      Right Ear: External ear normal.      Left Ear: External ear normal.      Nose: Congestion present.      Mouth/Throat:      Comments: There is no erythema or exudates. No tonsillar swelling or pus. The uvula is midline without deviation or edema. There is no soft palate swelling.   Eyes:      General: No scleral icterus.        Right eye: No discharge.         Left eye: No discharge.      Extraocular Movements: Extraocular movements intact.      Conjunctiva/sclera: Conjunctivae normal.   Cardiovascular:      Rate and Rhythm: Normal rate.      Heart sounds: Normal heart sounds. No murmur heard.     No friction rub. No gallop.   Pulmonary:      Effort: Pulmonary effort is normal. No respiratory distress.      Breath sounds: Normal breath sounds.   Abdominal:      General: Abdomen is flat. There is no distension.      Palpations: Abdomen is soft. There is no mass.      Tenderness: There is no abdominal tenderness.   Genitourinary:     Comments: Deferred  Skin:     General: Skin is warm and dry.   Neurological:      General: No focal deficit present.      Mental Status: She is alert.         ED Medications  Medications   acetaminophen (FOR EMS ONLY) (TYLENOL) oral suspension 1,300 mg (0 mg Does not apply Given to EMS 2/7/24 2337)   albuterol (PROVENTIL HFA,VENTOLIN HFA) inhaler 2 puff (2 puffs Inhalation Given 2/7/24 2341)   ibuprofen (MOTRIN) tablet 400 mg (400 mg Oral Given 2/7/24 2341)       Diagnostic Studies  Results Reviewed       Procedure Component Value Units Date/Time    POCT pregnancy, urine [[id number]]  (Normal) Resulted: 02/08/24 0058    Lab Status: Final result Updated: 02/08/24 0058     EXT Preg Test, Ur Negative     Control Valid                   XR chest 2 views   ED Interpretation by Charly Ruth DO (02/08 0027)   No acute cardiopulmonary disease      Final Result by Mai De Luna MD (02/08 0928)       No acute cardiopulmonary disease.            Workstation performed: [id number]               Procedures  Procedures      ED Course  ED Course as of 02/08/24 1400   Wed Feb 07, 2024 2336 Procedure Note: EKG  Date/Time: 02/07/24 11:36 PM   Interpreted by: Charly Ruth   Indications / Diagnosis: chest tightness  ECG reviewed by me, the ED Provider: yes   The EKG demonstrates:  Rhythm: normal sinus  Intervals: normal intervals  Axis: normal axis  QRS/Blocks: normal QRS  ST Changes: No acute ST Changes, no STD/CATIA.   Thu Feb 08, 2024   0105 PREGNANCY TEST URINE: Negative                             SBIRT 20yo+      Flowsheet Row Most Recent Value   Initial Alcohol Screen: US AUDIT-C     1. How often do you have a drink containing alcohol? 0 Filed at: 02/07/2024 2326   2. How many drinks containing alcohol do you have on a typical day you are drinking?  0 Filed at: 02/07/2024 2326   3a. Male UNDER 65: How often do you have five or more drinks on one occasion? 0 Filed at: 02/07/2024 2326   3b. FEMALE Any Age, or MALE 65+: How often do you have 4 or more drinks on one occassion? 0 Filed at: 02/07/2024 2326   Audit-C Score 0 Filed at: 02/07/2024 2326   SILVIA: How many times in the past year have you...    Used an illegal drug or used a prescription medication for non-medical reasons? Weekly Filed at: 02/07/2024 2326                  Medical Decision Making  Patient with history as above presented with a cough. History obtained from patient.    Differential diagnosis includes: viral URI, pneumonia, asthma with acute mild exacerbation now resolved, arrhythmia, pregnancy    Plan: CXR, ECG, urine pregnancy, albuterol inhaler, ibuprofen    Reviewed external records. ECG independently interpreted by myself as above. Independently reviewed imaging without acute cardiopulmonary disease on my interpretation. Patient was treated with ibuprofen, tylenol, albuterol with improvement in symptoms. Presentation most consistent with  viral URI with cough. Stable for outpatient management.    Disposition: Discharged with instructions to obtain outpatient follow up of patient's symptoms and findings with primary care physician, with strict return precautions if patient develops new or worsening symptoms. Patient understands this plan and is agreeable. All questions answered. Patient discharged home with return precautions.    Amount and/or Complexity of Data Reviewed  Labs: ordered. Decision-making details documented in ED Course.  Radiology: ordered and independent interpretation performed.    Risk  OTC drugs.  Prescription drug management.          Disposition  Final diagnoses:   Viral URI with cough     Time reflects when diagnosis was documented in both MDM as applicable and the Disposition within this note       Time User Action Codes Description Comment    2/8/2024 12:30 AM Charly Ruth [J06.9] Viral URI with cough           ED Disposition       ED Disposition   Discharge    Condition   Stable    Date/Time   Thu Feb 8, 2024 0030    Comment   Isabella De La Cruz discharge to home/self care.                   Follow-up Information       Follow up With Specialties Details Why Contact Info    [facility],  Internal Medicine, Multidisciplinary, Utilization Review Go in 1 week  [address] [facility] [address] [phone]              Discharge Medication List as of 2/8/2024  1:05 AM        CONTINUE these medications which have NOT CHANGED    Details   cyclobenzaprine (FLEXERIL) 5 mg tablet Take 1 tablet (5 mg total) by mouth daily at bedtime, Starting Thu 6/6/2019, Normal      ibuprofen (MOTRIN) 800 mg tablet TAKE 1 TABLET (800 MG TOTAL) BY MOUTH EVERY 6 (SIX) HOURS AS NEEDED FOR MILD PAIN (PAIN SCORE 1-3)., Historical Med      methocarbamol (ROBAXIN) 500 mg tablet TAKE 1 TABLET (500 MG TOTAL) BY MOUTH 4 (FOUR) TIMES A DAY AS NEEDED FOR MUSCLE SPASMS., Historical Med      methylPREDNISolone 4 MG tablet therapy pack Use  as directed on package, Normal      metroNIDAZOLE (FLAGYL) 500 mg tablet Take 1 tablet by mouth 2 (two) times a day, Starting Fri 7/1/2016, Historical Med      naproxen (NAPROSYN) 500 mg tablet Take 1 tablet (500 mg total) by mouth 2 (two) times a day with meals, Starting Mon 12/17/2018, Print           No discharge procedures on file.    PDMP Review       None             ED Provider  Attending physically available and evaluated Isabella De La Cruz. I managed the patient along with the ED Attending.    Electronically Signed by           Charly Ruth DO  02/08/24 5519

## 2024-02-08 NOTE — ED ATTENDING ATTESTATION
2/7/2024  I, Radha Chew MD, saw and evaluated the patient. I have discussed the patient with the resident/non-physician practitioner and agree with the resident's/non-physician practitioner's findings, Plan of Care, and MDM as documented in the resident's/non-physician practitioner's note, except where noted. All available labs and Radiology studies were reviewed.  I was present for key portions of any procedure(s) performed by the resident/non-physician practitioner and I was immediately available to provide assistance.       At this point I agree with the current assessment done in the Emergency Department.  I have conducted an independent evaluation of this patient a history and physical is as follows:  Cough or days, congestion, body aches, has felt febrile.  No true shortness of breath or chest pain.  Has coughed so hard that she has vomited.  No diarrhea.  Review of systems otherwise -12 systems reviewed.  On exam patient has moderate nasal congestion.  Vital signs were reviewed.  Patient is awake, alert, interactive.  The patient's pupils are equally round reactive to light.  Oropharynx is clear with moist mucous membranes.  Neck is supple and nontender with no adenopathy or JVD.  Heart is regular with no murmurs, rubs, or gallops.  Lungs are clear and equal with no wheezes, rales, or rhonchi.  Abdomen is soft and nontender with no masses, rebound, or guarding. There is no CVA tenderness.  The patient was completely exposed.  There is no skin breakdown.  There are no rashes or skin changes.  Extremities are warm and well perfused with good pulses. The patient has normal strength, sensation, and cranial nerves.  Medical decision making: Differential includes viral illness, doubt cardiac, doubt pulmonary embolus.  EKG reviewed by me, sinus rhythm with no ischemia, narrow complex.  Impression: Viral syndrome.  Will treat symptomatically and discharge  ED Course         Critical Care  Time  Procedures

## 2024-02-09 ENCOUNTER — VBI (OUTPATIENT)
Dept: SURGERY | Facility: CLINIC | Age: 34
End: 2024-02-09

## 2024-02-09 ENCOUNTER — APPOINTMENT (EMERGENCY)
Dept: RADIOLOGY | Facility: HOSPITAL | Age: 34
End: 2024-02-09
Payer: MEDICARE

## 2024-02-09 ENCOUNTER — HOSPITAL ENCOUNTER (EMERGENCY)
Facility: HOSPITAL | Age: 34
Discharge: HOME | End: 2024-02-09
Attending: EMERGENCY MEDICINE
Payer: MEDICARE

## 2024-02-09 VITALS
SYSTOLIC BLOOD PRESSURE: 114 MMHG | RESPIRATION RATE: 16 BRPM | HEART RATE: 98 BPM | OXYGEN SATURATION: 100 % | TEMPERATURE: 98 F | DIASTOLIC BLOOD PRESSURE: 75 MMHG

## 2024-02-09 DIAGNOSIS — B34.9 VIRAL SYNDROME: Primary | ICD-10-CM

## 2024-02-09 PROCEDURE — 96375 TX/PRO/DX INJ NEW DRUG ADDON: CPT

## 2024-02-09 PROCEDURE — 99284 EMERGENCY DEPT VISIT MOD MDM: CPT | Performed by: EMERGENCY MEDICINE

## 2024-02-09 PROCEDURE — 96361 HYDRATE IV INFUSION ADD-ON: CPT

## 2024-02-09 PROCEDURE — 96365 THER/PROPH/DIAG IV INF INIT: CPT

## 2024-02-09 PROCEDURE — 99283 EMERGENCY DEPT VISIT LOW MDM: CPT

## 2024-02-09 PROCEDURE — 71046 X-RAY EXAM CHEST 2 VIEWS: CPT

## 2024-02-09 RX ORDER — KETOROLAC TROMETHAMINE 30 MG/ML
30 INJECTION, SOLUTION INTRAMUSCULAR; INTRAVENOUS EVERY 8 HOURS SCHEDULED
Status: DISCONTINUED | OUTPATIENT
Start: 2024-02-09 | End: 2024-02-09 | Stop reason: HOSPADM

## 2024-02-09 RX ORDER — DIPHENHYDRAMINE HYDROCHLORIDE 50 MG/ML
25 INJECTION INTRAMUSCULAR; INTRAVENOUS EVERY 8 HOURS SCHEDULED
Status: DISCONTINUED | OUTPATIENT
Start: 2024-02-09 | End: 2024-02-09 | Stop reason: HOSPADM

## 2024-02-09 RX ORDER — MAGNESIUM SULFATE HEPTAHYDRATE 40 MG/ML
2 INJECTION, SOLUTION INTRAVENOUS
Status: DISCONTINUED | OUTPATIENT
Start: 2024-02-09 | End: 2024-02-09 | Stop reason: HOSPADM

## 2024-02-09 RX ORDER — METOCLOPRAMIDE HYDROCHLORIDE 5 MG/ML
10 INJECTION INTRAMUSCULAR; INTRAVENOUS EVERY 8 HOURS SCHEDULED
Status: DISCONTINUED | OUTPATIENT
Start: 2024-02-09 | End: 2024-02-09 | Stop reason: HOSPADM

## 2024-02-09 RX ADMIN — SODIUM CHLORIDE 1000 ML: 0.9 INJECTION, SOLUTION INTRAVENOUS at 08:16

## 2024-02-09 RX ADMIN — DIPHENHYDRAMINE HYDROCHLORIDE 25 MG: 50 INJECTION, SOLUTION INTRAMUSCULAR; INTRAVENOUS at 08:08

## 2024-02-09 RX ADMIN — KETOROLAC TROMETHAMINE 30 MG: 30 INJECTION, SOLUTION INTRAMUSCULAR; INTRAVENOUS at 08:08

## 2024-02-09 RX ADMIN — MAGNESIUM SULFATE HEPTAHYDRATE 2 G: 40 INJECTION, SOLUTION INTRAVENOUS at 08:09

## 2024-02-09 RX ADMIN — METOCLOPRAMIDE HYDROCHLORIDE 10 MG: 5 INJECTION INTRAMUSCULAR; INTRAVENOUS at 08:08

## 2024-02-09 NOTE — TELEPHONE ENCOUNTER
02/09/24 10:53 AM    Patient contacted post ED visit, first outreach attempt made. Message was left for patient to return a call to the [facility] Department at [facility]: Phone [phone].    Thank you.  [facility]  [facility]

## 2024-02-09 NOTE — DISCHARGE INSTRUCTIONS
You have been seen and evaluated in the emergency department for evaluation of cold-like symptoms.  On our evaluation, it appears that this is due to a viral syndrome.  Chest x-ray was normal.  Please follow-up with your primary care doctor and continue to take plenty of fluids over the next few days.  Please return if condition worsens, or if new symptoms arise.

## 2024-02-09 NOTE — ED PROVIDER NOTES
History  Chief Complaint   Patient presents with    Fever     Reports fevers at home, headache, and body aches. Took tylenol and nyquil with no relief     Patient is a 33-year-old female, no significant past medical history presenting today for evaluation of fever.  Patient states that over the past 3 days, she has been experiencing fever, myalgias, cough, congestion, and a headache.  Patient was seen here 2 days ago for the same symptoms, diagnosed with a viral infection, had a normal chest x-ray, and was discharged.  Patient states since then, she has had ongoing fevers up to Tmax of 104 yesterday.  Patient has been taking Tylenol, Motrin, and NyQuil at home with limited relief.  Patient states that cough was originally dry in nature, but over the past day has become productive of a yellow sputum.  Patient denies any hemoptysis, leg pain, leg swelling, or history of blood clots.  Patient reports no appetite, but states she has been drinking a lot of water.          Prior to Admission Medications   Prescriptions Last Dose Informant Patient Reported? Taking?   cyclobenzaprine (FLEXERIL) 5 mg tablet   No No   Sig: Take 1 tablet (5 mg total) by mouth daily at bedtime   ibuprofen (MOTRIN) 800 mg tablet   Yes No   Sig: TAKE 1 TABLET (800 MG TOTAL) BY MOUTH EVERY 6 (SIX) HOURS AS NEEDED FOR MILD PAIN (PAIN SCORE 1-3).   methocarbamol (ROBAXIN) 500 mg tablet   Yes No   Sig: TAKE 1 TABLET (500 MG TOTAL) BY MOUTH 4 (FOUR) TIMES A DAY AS NEEDED FOR MUSCLE SPASMS.   methylPREDNISolone 4 MG tablet therapy pack   No No   Sig: Use as directed on package   metroNIDAZOLE (FLAGYL) 500 mg tablet   Yes No   Sig: Take 1 tablet by mouth 2 (two) times a day   naproxen (NAPROSYN) 500 mg tablet   No No   Sig: Take 1 tablet (500 mg total) by mouth 2 (two) times a day with meals      Facility-Administered Medications: None       Past Medical History:   Diagnosis Date    ADHD (attention deficit hyperactivity disorder)     Asthma        Past  Surgical History:   Procedure Laterality Date    WISDOM TOOTH EXTRACTION         Family History   Problem Relation Age of Onset    Emphysema Mother     Dementia Mother     Lung cancer Mother     Hepatitis Father         C     Diabetes Family         due to underlying condition with diabetic mononeuropathy    Diabetes type II Family         without complication    Breast cancer Paternal Grandmother      I have reviewed and agree with the history as documented.    E-Cigarette/Vaping     E-Cigarette/Vaping Substances     Social History     Tobacco Use    Smoking status: Former     Current packs/day: 0.50     Types: Cigarettes    Smokeless tobacco: Never    Tobacco comments:     per allscripts - former smoker    Substance Use Topics    Alcohol use: Yes     Comment: social    Drug use: Yes     Types: Marijuana, Cocaine     Comment: last used 1.5 weeks ago        Review of Systems   Constitutional:  Positive for appetite change, chills and fever. Negative for activity change.   Respiratory:  Positive for cough. Negative for shortness of breath.    Cardiovascular:  Negative for chest pain.   Gastrointestinal:  Positive for abdominal pain and vomiting.   Neurological:  Positive for headaches. Negative for dizziness.       Physical Exam  ED Triage Vitals [02/09/24 0730]   Temperature Pulse Respirations Blood Pressure SpO2   98 °F (36.7 °C) 98 16 114/75 100 %      Temp Source Heart Rate Source Patient Position - Orthostatic VS BP Location FiO2 (%)   Oral Monitor -- -- --      Pain Score       --             Orthostatic Vital Signs  Vitals:    02/09/24 0730   BP: 114/75   Pulse: 98       Physical Exam  Vitals and nursing note reviewed.   Constitutional:       General: She is not in acute distress.     Appearance: Normal appearance. She is not ill-appearing or toxic-appearing.   HENT:      Head: Normocephalic and atraumatic.      Mouth/Throat:      Mouth: Mucous membranes are moist.   Eyes:      General: No scleral icterus.      Extraocular Movements: Extraocular movements intact.   Cardiovascular:      Rate and Rhythm: Normal rate and regular rhythm.      Pulses: Normal pulses.      Heart sounds: Normal heart sounds. No murmur heard.  Pulmonary:      Effort: Pulmonary effort is normal. No respiratory distress.      Breath sounds: Normal breath sounds. No wheezing or rhonchi.   Abdominal:      General: Abdomen is flat. There is no distension.      Palpations: Abdomen is soft.      Tenderness: There is no abdominal tenderness.   Musculoskeletal:         General: Normal range of motion.      Cervical back: Normal range of motion.   Skin:     General: Skin is warm.      Capillary Refill: Capillary refill takes less than 2 seconds.   Neurological:      General: No focal deficit present.      Mental Status: She is alert.      Cranial Nerves: No cranial nerve deficit.      Sensory: No sensory deficit.      Motor: No weakness.      Gait: Gait normal.   Psychiatric:         Mood and Affect: Mood normal.         Behavior: Behavior normal.         ED Medications  Medications   sodium chloride 0.9 % bolus 1,000 mL (0 mL Intravenous Stopped 2/9/24 0942)       Diagnostic Studies  Results Reviewed       None                   XR chest 2 views   ED Interpretation by Marge Hope MD (02/09 0940)   No active cardiopulmonary disease      Final Result by Mai De Luna MD (02/09 1029)      No acute cardiopulmonary disease.            Workstation performed: [id number]               Procedures  Procedures      ED Course  ED Course as of 02/09/24 1557   Fri Feb 09, 2024   0740 Patient seen and evaluated by me  DDx viral syndrome +/- superimposed pneumonia.  Not concerned for bacterial infection, not concerned for sepsis.   Workup and plan: Migraine cocktail, chest x-ray   0940 XR chest 2 views  Normal on my interpretation   0940 Patient reports feeling a little bit better   0941 Patient discharged at this time with viral illness, given return  precautions and follow-up information.  Patient reports understanding, all questions answered                                       Medical Decision Making  Please see ED course above regarding details of the MDM    Amount and/or Complexity of Data Reviewed  Radiology: ordered and independent interpretation performed. Decision-making details documented in ED Course.    Risk  Prescription drug management.          Disposition  Final diagnoses:   Viral syndrome     Time reflects when diagnosis was documented in both MDM as applicable and the Disposition within this note       Time User Action Codes Description Comment    2/9/2024  9:41 AM Marge Hope [B34.9] Viral syndrome           ED Disposition       ED Disposition   Discharge    Condition   Stable    Date/Time   Fri Feb 9, 2024  9:41 AM    Comment   Isabellamohan De La Cruz discharge to home/self care.                   Follow-up Information    None         Discharge Medication List as of 2/9/2024  9:42 AM        CONTINUE these medications which have NOT CHANGED    Details   cyclobenzaprine (FLEXERIL) 5 mg tablet Take 1 tablet (5 mg total) by mouth daily at bedtime, Starting Thu 6/6/2019, Normal      ibuprofen (MOTRIN) 800 mg tablet TAKE 1 TABLET (800 MG TOTAL) BY MOUTH EVERY 6 (SIX) HOURS AS NEEDED FOR MILD PAIN (PAIN SCORE 1-3)., Historical Med      methocarbamol (ROBAXIN) 500 mg tablet TAKE 1 TABLET (500 MG TOTAL) BY MOUTH 4 (FOUR) TIMES A DAY AS NEEDED FOR MUSCLE SPASMS., Historical Med      methylPREDNISolone 4 MG tablet therapy pack Use as directed on package, Normal      metroNIDAZOLE (FLAGYL) 500 mg tablet Take 1 tablet by mouth 2 (two) times a day, Starting Fri 7/1/2016, Historical Med      naproxen (NAPROSYN) 500 mg tablet Take 1 tablet (500 mg total) by mouth 2 (two) times a day with meals, Starting Mon 12/17/2018, Print           No discharge procedures on file.    PDMP Review       None             ED Provider  Attending physically available  and evaluated Isabellamohan De La Cruz. I managed the patient along with the ED Attending.    Electronically Signed by           Marge Hope MD  02/09/24 4342

## 2024-02-09 NOTE — ED ATTENDING ATTESTATION
2/9/2024  I, Arcenio Mason MD, saw and evaluated the patient. I have discussed the patient with the resident/non-physician practitioner and agree with the resident's/non-physician practitioner's findings, Plan of Care, and MDM as documented in the resident's/non-physician practitioner's note, except where noted. All available labs and Radiology studies were reviewed.  I was present for key portions of any procedure(s) performed by the resident/non-physician practitioner and I was immediately available to provide assistance.       At this point I agree with the current assessment done in the Emergency Department.  I have conducted an independent evaluation of this patient a history and physical is as follows:    ED Course     33-year-old female with productive cough and fevers body aches.  Unrelieved with home NyQuil with Tylenol.    Vitals reviewed  Well-appearing nontoxic no acute distress nasal congestion present.  Heart regular rate rhythm without murmurs.  Lungs clear to auscultation bilaterally.  Ab soft nontender nondistended normal bowel sounds per extremities no edema.  Neck supple no meningismus.  No rash.    Impression: Fever, cough  Differential diagnosis: Viral syndrome, bronchitis, pneumonia    Plan to treat with ketorolac Reglan fluids    Plan check x-ray of chest to rule out pneumonia    Chest x-ray independently interpreted by me no acute cardiopulmonary disease.  Patient stable for discharge    Critical Care Time  Procedures

## 2024-02-12 NOTE — TELEPHONE ENCOUNTER
02/12/24 2:14 PM    Patient contacted post ED visit, [facility] department spoke with patient/caregiver and outreach was successful.    Thank you.  Ayo Madison MA  [facility]

## 2024-03-25 ENCOUNTER — NURSE TRIAGE (OUTPATIENT)
Age: 34
End: 2024-03-25

## 2024-03-25 NOTE — TELEPHONE ENCOUNTER
"Patient called requesting STI testing. Patient is not an established patient of [facility]. She states she had vaginal sex last week and the condom broke and now \"doesn't feel right\"  she think she could have a STI. Patient does have a hx herpes but states she doesn't have any symptoms. -HPT. Denies abnormal discharge, odor, fever. Advise patient to go to [facility], [facility], or PCP for testing. Patient transferred to [facility] to establish care with [facility].     Reason for Disposition   Patient is worried they have a sexually transmitted infection (STI)    Answer Assessment - Initial Assessment Questions  1. TYPE of EXPOSURE: \"How were you exposed to the STI?\" (e.g., oral, vaginal, or rectal intercourse)      Vaginal intercourse last week and condom broke    3. SYMPTOMS: \"Do you have any symptoms?\" (e.g., pain with urination)  ---nausea, sore breasts, \"feels sick not like a cold though\"  1. EXPOSURE OF: \"What part of your body was exposed?\" (e.g., vagina, rectum, eye, mouth, skin)      vagina  2. EXPOSURE TO:  \"What body fluid were you exposed to?\" (e.g., blood, semen, vaginal secretions, saliva)      semen  3. TYPE of CONTACT: \"What type of exposure occurred?\" (e.g., sexual intercourse and condom use, needlestick, kissing, touching)      Sexula intercourse  4. DATE of CONTACT: \"When did this exposure occur?\" (i.e., date and time of contact)      Last week  5. SOURCE HIV POSITIVE: \"Is the other person HIV positive?\" (e.g., yes, no, unknown)      unknown  6. SOURCE RISKS: \"Does the other person have risks for HIV?\" (e.g., IV drug use, men who have sex with men, commercial sex worker / prostitute)      unknown  7. SYMPTOMS: \"Do you have any symptoms?\" (e.g., fever)      denies    Protocols used: STI Exposure and Prevention-ADULT-[place]    "

## 2024-04-12 ENCOUNTER — OFFICE VISIT (OUTPATIENT)
Dept: OBGYN CLINIC | Facility: CLINIC | Age: 34
End: 2024-04-12

## 2024-04-12 VITALS
BODY MASS INDEX: 27.68 KG/M2 | HEIGHT: 66 IN | WEIGHT: 172.2 LBS | DIASTOLIC BLOOD PRESSURE: 89 MMHG | HEART RATE: 60 BPM | SYSTOLIC BLOOD PRESSURE: 122 MMHG

## 2024-04-12 DIAGNOSIS — Z11.3 SCREEN FOR STD (SEXUALLY TRANSMITTED DISEASE): ICD-10-CM

## 2024-04-12 DIAGNOSIS — Z01.419 WOMEN'S ANNUAL ROUTINE GYNECOLOGICAL EXAMINATION: Primary | ICD-10-CM

## 2024-04-12 DIAGNOSIS — Z12.39 ENCOUNTER FOR BREAST CANCER SCREENING USING NON-MAMMOGRAM MODALITY: ICD-10-CM

## 2024-04-12 DIAGNOSIS — Z12.4 CERVICAL CANCER SCREENING: ICD-10-CM

## 2024-04-12 PROBLEM — A60.9 HSV (HERPES SIMPLEX VIRUS) ANOGENITAL INFECTION: Status: ACTIVE | Noted: 2024-04-12

## 2024-04-12 PROCEDURE — 87491 CHLMYD TRACH DNA AMP PROBE: CPT | Performed by: NURSE PRACTITIONER

## 2024-04-12 PROCEDURE — 88175 CYTOPATH C/V AUTO FLUID REDO: CPT | Performed by: NURSE PRACTITIONER

## 2024-04-12 PROCEDURE — 87591 N.GONORRHOEAE DNA AMP PROB: CPT | Performed by: NURSE PRACTITIONER

## 2024-04-12 PROCEDURE — 99385 PREV VISIT NEW AGE 18-39: CPT | Performed by: NURSE PRACTITIONER

## 2024-04-12 NOTE — PROGRESS NOTES
ANNUAL GYNECOLOGICAL EXAMINATION    Isabella De La Cruz is a 33 y.o. female who presents today for annual GYN exam.  Her last pap smear was performed 2018 and result was NILM, -HPV.  She reports no history of abnormal pap smears in her past. She had HIV screening performed 2016 and it was negative.  She reports menses as regular lasting 5 days with moderate flow. Patient is sexually active and has had two partners in the past year. She has history of HSV.  Patient's last menstrual period was 2024 (exact date).  Her general medical history has been reviewed and she reports it as follows:    Past Medical History:   Diagnosis Date    ADHD (attention deficit hyperactivity disorder)     Asthma      Past Surgical History:   Procedure Laterality Date    WISDOM TOOTH EXTRACTION       OB History          1    Para        Term                AB   1    Living             SAB        IAB        Ectopic        Multiple        Live Births                   Social History     Tobacco Use    Smoking status: Former     Current packs/day: 0.50     Types: Cigarettes    Smokeless tobacco: Never    Tobacco comments:     per allscripts - former smoker    Vaping Use    Vaping status: Every Day    Substances: Nicotine, Flavoring   Substance Use Topics    Alcohol use: Not Currently     Comment: social    Drug use: Not Currently     Types: Marijuana, Cocaine     Comment: last used 1.5 weeks ago     Social History     Substance and Sexual Activity   Sexual Activity Yes    Partners: Female    Birth control/protection: None     Cancer-related family history includes Breast cancer in her paternal grandmother; Lung cancer in her mother.    Current Outpatient Medications   Medication Instructions    cyclobenzaprine (FLEXERIL) 5 mg, Oral, Daily at bedtime    ibuprofen (MOTRIN) 800 mg tablet TAKE 1 TABLET (800 MG TOTAL) BY MOUTH EVERY 6 (SIX) HOURS AS NEEDED FOR MILD PAIN (PAIN SCORE 1-3).    methocarbamol (ROBAXIN) 500 mg  "tablet TAKE 1 TABLET (500 MG TOTAL) BY MOUTH 4 (FOUR) TIMES A DAY AS NEEDED FOR MUSCLE SPASMS.    methylPREDNISolone 4 MG tablet therapy pack Use as directed on package    metroNIDAZOLE (FLAGYL) 500 mg tablet 1 tablet, 2 times daily    naproxen (NAPROSYN) 500 mg, Oral, 2 times daily with meals       Review of Systems:  Review of Systems   Constitutional:  Negative for chills and fever.   HENT:  Negative for ear pain and sore throat.    Eyes:  Negative for pain and visual disturbance.   Respiratory:  Negative for cough and shortness of breath.    Cardiovascular:  Negative for chest pain and palpitations.   Gastrointestinal:  Negative for abdominal pain and vomiting.   Genitourinary:  Negative for dysuria and hematuria.   Musculoskeletal:  Negative for arthralgias and back pain.   Skin:  Negative for color change and rash.   Neurological:  Negative for seizures and syncope.   All other systems reviewed and are negative.      Physical Exam:  /89 (BP Location: Right arm, Patient Position: Sitting)   Pulse 60   Ht 5' 6\" (1.676 m)   Wt 78.1 kg (172 lb 3.2 oz)   LMP 03/01/2024 (Exact Date)   BMI 27.79 kg/m²   Physical Exam  Constitutional:       General: She is not in acute distress.     Appearance: Normal appearance.   Genitourinary:      Vulva and bladder normal.      No lesions in the vagina.      No vaginal erythema or ulceration.        Right Adnexa: not tender and no mass present.     Left Adnexa: not tender and no mass present.     No cervical motion tenderness or lesion.      Uterus is not enlarged or tender.      No uterine mass detected.  Breasts:     Right: No mass, nipple discharge or skin change.      Left: No mass, nipple discharge or skin change.   Cardiovascular:      Rate and Rhythm: Normal rate and regular rhythm.   Pulmonary:      Effort: Pulmonary effort is normal.      Breath sounds: Normal breath sounds.   Abdominal:      General: Abdomen is flat.      Palpations: Abdomen is soft. "   Musculoskeletal:      Cervical back: Neck supple.   Neurological:      Mental Status: She is alert.   Skin:     General: Skin is warm and dry.   Psychiatric:         Mood and Affect: Mood normal.         Behavior: Behavior normal.   Vitals reviewed.           Assessment/Plan:   1. Normal well-woman GYN exam.  2.. Cervical cancer screening:  Normal cervical exam.  Pap smear done with HPV co-testing.  Has not received HPV vaccine in the past.  Offered vaccine series to patient now and she declines. Provided with information regarding the HPV vaccine and will notify office if desires to initiate.     3. STD screening:   Orders placed for vaginal GC/CT cultures.  Orders placed for serum anti-HIV, anti-HCV, HbsAg, syphilis panel.   4. Breast cancer screening:  Normal breast exam.    Reviewed breast self-awareness. Has history of paternal grandmother with breast cancer diagnosed at an early age. Patient will notify office of age of onset to initiate early screening mammogram if warranted.    5. Colon cancer screening:  will begin at age 45.    6. Depression Screening: Patient's depression screening was assessed with a PHQ-2 score of 2. Their PHQ-9 score was 6.     7. BMI Counseling: Body mass index is 27.79 kg/m². The BMI is above normal. Exercise recommendations include exercising 3-5 times per week.   8. Contraception:  Tracking ovulation cycles and condom use.   9. Return to office as needed and next year for annual exam.    Reviewed with patient that [facility], but that they will not necessarily be reviewed by me immediately.  Explained that I will review results at my earliest opportunity and contact patient appropriately.

## 2024-04-15 LAB
HPV HR 12 DNA CVX QL NAA+PROBE: NEGATIVE
HPV16 DNA CVX QL NAA+PROBE: NEGATIVE
HPV18 DNA CVX QL NAA+PROBE: POSITIVE

## 2024-04-16 LAB
C TRACH DNA SPEC QL NAA+PROBE: NEGATIVE
N GONORRHOEA DNA SPEC QL NAA+PROBE: NEGATIVE

## 2024-04-20 LAB
LAB AP GYN PRIMARY INTERPRETATION: NORMAL
Lab: NORMAL
PATH INTERP SPEC-IMP: NORMAL

## 2024-04-29 ENCOUNTER — HOSPITAL ENCOUNTER (EMERGENCY)
Facility: HOSPITAL | Age: 34
Discharge: HOME | End: 2024-04-29
Attending: EMERGENCY MEDICINE
Payer: OTHER MISCELLANEOUS

## 2024-04-29 VITALS
HEART RATE: 78 BPM | DIASTOLIC BLOOD PRESSURE: 76 MMHG | RESPIRATION RATE: 18 BRPM | OXYGEN SATURATION: 99 % | TEMPERATURE: 98 F | SYSTOLIC BLOOD PRESSURE: 140 MMHG

## 2024-04-29 DIAGNOSIS — T78.40XA ALLERGIC REACTION, INITIAL ENCOUNTER: Primary | ICD-10-CM

## 2024-04-29 DIAGNOSIS — R06.2 WHEEZING: ICD-10-CM

## 2024-04-29 PROCEDURE — 96374 THER/PROPH/DIAG INJ IV PUSH: CPT

## 2024-04-29 PROCEDURE — 99282 EMERGENCY DEPT VISIT SF MDM: CPT

## 2024-04-29 PROCEDURE — 96375 TX/PRO/DX INJ NEW DRUG ADDON: CPT

## 2024-04-29 PROCEDURE — 99284 EMERGENCY DEPT VISIT MOD MDM: CPT | Performed by: EMERGENCY MEDICINE

## 2024-04-29 RX ORDER — METHYLPREDNISOLONE SODIUM SUCCINATE 125 MG/2ML
125 INJECTION, POWDER, LYOPHILIZED, FOR SOLUTION INTRAMUSCULAR; INTRAVENOUS ONCE
Status: COMPLETED | OUTPATIENT
Start: 2024-04-29 | End: 2024-04-29

## 2024-04-29 RX ORDER — FAMOTIDINE 10 MG/ML
20 INJECTION, SOLUTION INTRAVENOUS ONCE
Status: COMPLETED | OUTPATIENT
Start: 2024-04-29 | End: 2024-04-29

## 2024-04-29 RX ORDER — EPINEPHRINE 0.3 MG/.3ML
0.3 INJECTION SUBCUTANEOUS ONCE
Qty: 0.6 ML | Refills: 0 | Status: SHIPPED | OUTPATIENT
Start: 2024-04-29 | End: 2024-04-29

## 2024-04-29 RX ADMIN — METHYLPREDNISOLONE SODIUM SUCCINATE 125 MG: 125 INJECTION, POWDER, FOR SOLUTION INTRAMUSCULAR; INTRAVENOUS at 14:53

## 2024-04-29 RX ADMIN — FAMOTIDINE 20 MG: 10 INJECTION INTRAVENOUS at 14:53

## 2024-04-29 NOTE — DISCHARGE INSTRUCTIONS
Use Epi pen as needed if you have anaphylactic reaction  Followup with primary care doctor  Avoid allergens

## 2024-04-29 NOTE — ED ATTENDING ATTESTATION
4/29/2024  I, Jose F Oscar DO, saw and evaluated the patient. I have discussed the patient with the resident/non-physician practitioner and agree with the resident's/non-physician practitioner's findings, Plan of Care, and MDM as documented in the resident's/non-physician practitioner's note, except where noted. All available labs and Radiology studies were reviewed.  I was present for key portions of any procedure(s) performed by the resident/non-physician practitioner and I was immediately available to provide assistance.       At this point I agree with the current assessment done in the Emergency Department.  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  When they arrived they gave the patient her own EpiPen.  At that point she felt like her throat was closing also had some wheezing they administered albuterol.  Patient felt better.  Currently just feels slightly tired and itchy.  EMS also gave her Benadryl.  On exam she has no oropharyngeal swelling however does have an expiratory wheezing.  Plan to treat with steroids observed for recrudescence and biphasic reaction    ED Course         Critical Care Time  Procedures

## 2024-04-29 NOTE — Clinical Note
Isabella De La Cruz was seen and treated in our emergency department on 4/29/2024.                Diagnosis: allergic reaction    Isabella  may return to work on return date.    She may return on this date: 04/30/2024         If you have any questions or concerns, please don't hesitate to call.      Moni Rico, DO    ______________________________           _______________          _______________  Hospital Representative                              Date                                Time

## 2024-04-29 NOTE — ED PROVIDER NOTES
History  Chief Complaint   Patient presents with    Bee Sting     Felt sting to right side of neck. Had eye itching, throat tightness; [place] ems found her wheezing and gave her albuterol. She also gave herself her epi pen     HPI  MDM  33-year-old female, history of anaphylaxis to bees, comes in with shortness of breath, and sensation of throat closing up. Got 0.3mg of IM Epi by EMS and Bendryl, symptoms improved. Currently still mildly wheezy, no throat /tongue or lip swelling. +nausea, -vomiting, no abdominal pain., no obvious urticarial rash.     Initial presentation pt is wheezing, otherwise hemodynamically stable    On exam   Pertinent exam  On exam, patient has mild expiratory wheezing,, speaking in full sentences, no acute distress, no lip or throat swelling.  No obvious extensive rash,    Ddx: Severe allergy, anaphylaxis    Plan: Patient received epi by EMS, was given Pepcid, Solu-Medrol in the ED and observed for symptomatic relief.  Patient was discharged with EpiPen and outpatient follow-up and strict precautions.      Final Dispo:     Pt is hemodynamically stable and clear for discharge with outpatient f/u with their PCP. Return precautions given pt verbalized understanding      Prior to Admission Medications   Prescriptions Last Dose Informant Patient Reported? Taking?   cyclobenzaprine (FLEXERIL) 5 mg tablet   No No   Sig: Take 1 tablet (5 mg total) by mouth daily at bedtime   Patient not taking: Reported on 4/12/2024   ibuprofen (MOTRIN) 800 mg tablet   Yes No   Sig: TAKE 1 TABLET (800 MG TOTAL) BY MOUTH EVERY 6 (SIX) HOURS AS NEEDED FOR MILD PAIN (PAIN SCORE 1-3).   Patient not taking: Reported on 4/12/2024   methocarbamol (ROBAXIN) 500 mg tablet   Yes No   Sig: TAKE 1 TABLET (500 MG TOTAL) BY MOUTH 4 (FOUR) TIMES A DAY AS NEEDED FOR MUSCLE SPASMS.   Patient not taking: Reported on 4/12/2024   methylPREDNISolone 4 MG tablet therapy pack   No No   Sig: Use as directed on package   Patient not taking:  Reported on 4/12/2024   metroNIDAZOLE (FLAGYL) 500 mg tablet   Yes No   Sig: Take 1 tablet by mouth 2 (two) times a day   Patient not taking: Reported on 4/12/2024   naproxen (NAPROSYN) 500 mg tablet   No No   Sig: Take 1 tablet (500 mg total) by mouth 2 (two) times a day with meals   Patient not taking: Reported on 4/12/2024      Facility-Administered Medications: None       Past Medical History:   Diagnosis Date    ADHD (attention deficit hyperactivity disorder)     Asthma     HSV (herpes simplex virus) anogenital infection 04/12/2024    Human papillomavirus (HPV) type 18 DNA detected in cervical specimen 04/12/2024       Past Surgical History:   Procedure Laterality Date    WISDOM TOOTH EXTRACTION         Family History   Problem Relation Age of Onset    Emphysema Mother     Dementia Mother     Lung cancer Mother     Hepatitis Father         C     Diabetes Family         due to underlying condition with diabetic mononeuropathy    Diabetes type II Family         without complication    Breast cancer Paternal Grandmother      I have reviewed and agree with the history as documented.    E-Cigarette/Vaping    E-Cigarette Use Current Every Day User      E-Cigarette/Vaping Substances    Nicotine Yes     THC No     CBD No     Flavoring Yes     Other No      Social History     Tobacco Use    Smoking status: Former     Current packs/day: 0.50     Types: Cigarettes    Smokeless tobacco: Never    Tobacco comments:     per allscripts - former smoker    Vaping Use    Vaping status: Every Day    Substances: Nicotine, Flavoring   Substance Use Topics    Alcohol use: Not Currently     Comment: social    Drug use: Not Currently     Types: Marijuana, Cocaine     Comment: last used 1.5 weeks ago        Review of Systems    Physical Exam  ED Triage Vitals   Temperature Pulse Respirations Blood Pressure SpO2   04/29/24 1442 04/29/24 1442 04/29/24 1442 04/29/24 1442 04/29/24 1442   98 °F (36.7 °C) 100 16 140/76 100 %      Temp Source  Heart Rate Source Patient Position - Orthostatic VS BP Location FiO2 (%)   04/29/24 1442 04/29/24 1545 04/29/24 1442 04/29/24 1442 --   Oral Monitor Lying Right arm       Pain Score       04/29/24 1442       No Pain             Orthostatic Vital Signs  Vitals:    04/29/24 1442 04/29/24 1545   BP: 140/76    Pulse: 100 78   Patient Position - Orthostatic VS: Lying        Physical Exam    ED Medications  Medications   diphenhydrAMINE (FOR EMS ONLY) (BENADRYL) injection 50 mg (0 mg Does not apply Given to EMS 4/29/24 1439)   Famotidine (PF) (PEPCID) injection 20 mg (20 mg Intravenous Given 4/29/24 1453)   methylPREDNISolone sodium succinate (Solu-MEDROL) injection 125 mg (125 mg Intravenous Given 4/29/24 1453)       Diagnostic Studies  Results Reviewed       None                   No orders to display         Procedures  Procedures      ED Course  ED Course as of 05/08/24 1244   Mon Apr 29, 2024   1503 33-year-old female, history of anaphylaxis to bees, comes in with shortness of breath, and sensation of throat closing up. Got 0.3mg of IM Epi by EMS and Bendryl, symptoms improved. Currently still mildly wheezy, no throat /tongue or lip swelling. +nausea, -vomiting, no abdominal pain., no obvious urticarial rash.    1559 On exam, patient has mild expiratory wheezing,, speaking in full sentences, no acute distress, no lip or throat swelling.  No obvious extensive rash,                             SBIRT 22yo+      Flowsheet Row Most Recent Value   Initial Alcohol Screen: US AUDIT-C     1. How often do you have a drink containing alcohol? 0 Filed at: 04/29/2024 1443   2. How many drinks containing alcohol do you have on a typical day you are drinking?  0 Filed at: 04/29/2024 1443   3b. FEMALE Any Age, or MALE 65+: How often do you have 4 or more drinks on one occassion? 0 Filed at: 04/29/2024 1443   Audit-C Score 0 Filed at: 04/29/2024 1443   SILVIA: How many times in the past year have you...    Used an illegal drug or used  a prescription medication for non-medical reasons? Never Filed at: 04/29/2024 1443                  Medical Decision Making  Risk  Prescription drug management.          Disposition  Final diagnoses:   Allergic reaction, initial encounter   Wheezing     Time reflects when diagnosis was documented in both MDM as applicable and the Disposition within this note       Time User Action Codes Description Comment    4/29/2024  4:35 PM Moni Rico Add [T78.40XA] Allergic reaction, initial encounter     4/29/2024  4:36 PM Moni Rico Add [R06.2] Wheezing           ED Disposition       ED Disposition   Discharge    Condition   Stable    Date/Time   Mon Apr 29, 2024 1636    Comment   Isabella Kassy Jono discharge to home/self care.                   Follow-up Information       Follow up With Specialties Details Why Contact Info Additional Information    Sierra Zurita,  Internal Medicine, Multidisciplinary, Utilization Review In 1 week  [address] [facility] [address]  [phone]       [facility] Emergency Department Emergency Medicine  If symptoms worsen [address] [facility] [address]  [phone] [facility] Emergency Department, [address], [place], [address]   [phone]            Discharge Medication List as of 4/29/2024  4:37 PM        START taking these medications    Details   EPINEPHrine (EPIPEN) 0.3 mg/0.3 mL SOAJ Inject 0.3 mL (0.3 mg total) into a muscle once for 1 dose, Starting Mon 4/29/2024, Normal           CONTINUE these medications which have NOT CHANGED    Details   cyclobenzaprine (FLEXERIL) 5 mg tablet Take 1 tablet (5 mg total) by mouth daily at bedtime, Starting Thu 6/6/2019, Normal      ibuprofen (MOTRIN) 800 mg tablet TAKE 1 TABLET (800 MG TOTAL) BY MOUTH EVERY 6 (SIX) HOURS AS NEEDED FOR MILD PAIN (PAIN SCORE 1-3)., Historical Med      methocarbamol (ROBAXIN) 500 mg tablet TAKE 1 TABLET (500  MG TOTAL) BY MOUTH 4 (FOUR) TIMES A DAY AS NEEDED FOR MUSCLE SPASMS., Historical Med      methylPREDNISolone 4 MG tablet therapy pack Use as directed on package, Normal      metroNIDAZOLE (FLAGYL) 500 mg tablet Take 1 tablet by mouth 2 (two) times a day, Starting Fri 7/1/2016, Historical Med      naproxen (NAPROSYN) 500 mg tablet Take 1 tablet (500 mg total) by mouth 2 (two) times a day with meals, Starting Mon 12/17/2018, Print           No discharge procedures on file.    PDMP Review       None             ED Provider  Attending physically available and evaluated Isabella Johnstongo. I managed the patient along with the ED Attending.    Electronically Signed by           Moni Rico DO  05/08/24 3919

## 2024-05-07 ENCOUNTER — PROCEDURE VISIT (OUTPATIENT)
Dept: OBGYN CLINIC | Facility: CLINIC | Age: 34
End: 2024-05-07

## 2024-05-07 VITALS
BODY MASS INDEX: 27.9 KG/M2 | HEIGHT: 66 IN | SYSTOLIC BLOOD PRESSURE: 125 MMHG | WEIGHT: 173.6 LBS | HEART RATE: 70 BPM | RESPIRATION RATE: 18 BRPM | DIASTOLIC BLOOD PRESSURE: 83 MMHG

## 2024-05-07 DIAGNOSIS — F17.200 NEEDS SMOKING CESSATION EDUCATION: ICD-10-CM

## 2024-05-07 DIAGNOSIS — Z32.02 PREGNANCY TEST NEGATIVE: ICD-10-CM

## 2024-05-07 DIAGNOSIS — R87.810 HUMAN PAPILLOMAVIRUS (HPV) TYPE 18 DNA DETECTED IN CERVICAL SPECIMEN: Primary | ICD-10-CM

## 2024-05-07 LAB — SL AMB POCT URINE HCG: NEGATIVE

## 2024-05-07 PROCEDURE — 81025 URINE PREGNANCY TEST: CPT | Performed by: OBSTETRICS & GYNECOLOGY

## 2024-05-07 PROCEDURE — 57454 BX/CURETT OF CERVIX W/SCOPE: CPT | Performed by: OBSTETRICS & GYNECOLOGY

## 2024-05-07 PROCEDURE — 88305 TISSUE EXAM BY PATHOLOGIST: CPT | Performed by: PATHOLOGY

## 2024-05-07 RX ORDER — BUPROPION HYDROCHLORIDE 150 MG/1
150 TABLET ORAL DAILY
Qty: 30 TABLET | Refills: 3 | Status: SHIPPED | OUTPATIENT
Start: 2024-05-07

## 2024-05-07 NOTE — PROGRESS NOTES
OB/GYN VISIT  Isabella De La Cruz  2024  3:06 PM    Subjective:     Isabella De La Cruz is a 33 y.o.  female who presents for colposcopy. She is nervous about it, and upset about new HPV diagnosis.    Pap Hx:  3/2016: NILM  3/2018: NILM  2024: NILM, HPV 18+    Former smoker tobacco user, now using a vape and is interested in medication assisted treatment      Past Medical History:   Diagnosis Date    ADHD (attention deficit hyperactivity disorder)     Asthma     HSV (herpes simplex virus) anogenital infection 2024    Human papillomavirus (HPV) type 18 DNA detected in cervical specimen 2024     Past Surgical History:   Procedure Laterality Date    WISDOM TOOTH EXTRACTION         Objective:    Vitals: Last menstrual period 2024, not currently breastfeeding.There is no height or weight on file to calculate BMI.       Colposcopy     Date/Time  2024 3:30 PM     [facility]re[facility] Protocol   Procedure performed by: (Dr. Kern)  Consent: Verbal consent obtained. Written consent obtained.  Consent given by: patient  Patient understanding: patient states understanding of the procedure being performed  Patient consent: the patient's understanding of the procedure matches consent given  Procedure consent: procedure consent matches procedure scheduled  Patient identity confirmed: verbally with patient     Performed by  Hazel Davis DO   Authorized by  Hazel Davis DO     Pre-procedure details      Prepped with: acetic acid     Indication    Indications: NILM +HPV18.   Procedure Details   Procedure: Colposcopy w/ cervical biopsy and ECC      Under satisfactory analgesia the patient was prepped and draped in the dorsal lithotomy position: yes      [place] speculum was placed in the vagina: yes      Under colposcopic examination the transition zone was seen in entirety: yes      Endocervix was curetted using a Kevorkian curette: yes      Cervical biopsy performed with a cervical biopsy  punch: yes      Tampon inserted: no      Monsel's solution was applied: yes      Biopsy(s): yes      Location:  3 and 9 oclock    Specimen to pathology: yes     Post-procedure      Findings: Mosaicism and White epithelium      Impression: Low grade cervical dysplasia      Patient tolerance of procedure:  Tolerated with difficulty   Comments       Tolerated with coaching. White mosaicism and punctations seen along lateral edges of cervix at 3 and 9 o'clock. Biopsies taken, followed by ECC.            Assessment/Plan:    HPV 18  - Colpo today  - Reassurance provided that about 80% of sexually active people are exposed and she may have been + for this for years  - She is currently abstinent    Smoking Cessation  - Tobacco Cessation Counseling: Tobacco cessation counseling and education was provided. The patient is sincerely urged to quit consumption of tobacco. She is ready to quit tobacco. The numerous health risks of tobacco consumption were discussed. Prescribed the following medications: bupropion.  - We discussed sometimes patients will get a slight increase in anxiety as it is a stimulating medication  - She reports she recently started working with a psychiatrist, she had declined meds at that time as she prefers to do holistic measures    3. Depression  - Wellbutrin will also work for this  - Reports hx of suicide attempt a few years ago    RTO in 2 weeks for med check and results    Isabella was seen today for colposcopy.    Diagnoses and all orders for this visit:    Human papillomavirus (HPV) type 18 DNA detected in cervical specimen  -     Tissue Exam  -     Colposcopy    Pregnancy test negative  -     POCT urine HCG    Needs smoking cessation education  -     buPROPion (Wellbutrin XL) 150 mg 24 hr tablet; Take 1 tablet (150 mg total) by mouth daily          Problem List Items Addressed This Visit    None      D/w Dr. Erlin Davis,   5/7/2024  3:06 PM        Please note that while the recent  CURES Act permits medical records be visible for patient review, clinical documentation is intended for utilization by healthcare professionals.  All test results are immediately available through [facility] as well, and we will review results at earliest opportunity and contact you with the appropriate urgency.  If you have a question about something you see in your chart, please don't hesitate to ask about it at your next appointment.

## 2024-05-10 PROCEDURE — 88305 TISSUE EXAM BY PATHOLOGIST: CPT | Performed by: PATHOLOGY

## 2024-05-30 ENCOUNTER — OFFICE VISIT (OUTPATIENT)
Dept: OBGYN CLINIC | Facility: CLINIC | Age: 34
End: 2024-05-30

## 2024-05-30 VITALS
WEIGHT: 178 LBS | BODY MASS INDEX: 28.61 KG/M2 | SYSTOLIC BLOOD PRESSURE: 114 MMHG | HEART RATE: 77 BPM | HEIGHT: 66 IN | DIASTOLIC BLOOD PRESSURE: 79 MMHG

## 2024-05-30 DIAGNOSIS — R87.810 HUMAN PAPILLOMAVIRUS (HPV) TYPE 18 DNA DETECTED IN CERVICAL SPECIMEN: ICD-10-CM

## 2024-05-30 DIAGNOSIS — Z23 NEED FOR HPV VACCINATION: Primary | ICD-10-CM

## 2024-05-30 DIAGNOSIS — F17.200 TOBACCO DEPENDENCE: ICD-10-CM

## 2024-05-30 PROCEDURE — 99213 OFFICE O/P EST LOW 20 MIN: CPT | Performed by: OBSTETRICS & GYNECOLOGY

## 2024-05-30 PROCEDURE — 90471 IMMUNIZATION ADMIN: CPT | Performed by: OBSTETRICS & GYNECOLOGY

## 2024-05-30 PROCEDURE — 90651 9VHPV VACCINE 2/3 DOSE IM: CPT | Performed by: OBSTETRICS & GYNECOLOGY

## 2024-05-30 NOTE — ASSESSMENT & PLAN NOTE
Previously on Wellbutrin for both depression and tobacco cessation but stopped due to concern for false positive on probation drug testing  Referral placed to smoking cessation program  Feels mood is stable, has outpatient virtual therapist   Encouraged patient to also be seen by her PCP

## 2024-05-30 NOTE — PROGRESS NOTES
Problem Visit   24     SUBJECTIVE:  HPI: Isabella De La Cruz is a 33 y.o.  female who presents to discuss colpo results. No new complaints. See A/P for details.     Past Medical History:   Diagnosis Date    ADHD (attention deficit hyperactivity disorder)     Asthma     HSV (herpes simplex virus) anogenital infection 2024    Human papillomavirus (HPV) type 18 DNA detected in cervical specimen 2024       Past Surgical History:   Procedure Laterality Date    WISDOM TOOTH EXTRACTION         Social History     Tobacco Use    Smoking status: Former     Current packs/day: 0.50     Types: Cigarettes    Smokeless tobacco: Never    Tobacco comments:     per allscripts - former smoker    Vaping Use    Vaping status: Every Day    Substances: Nicotine, Flavoring   Substance Use Topics    Alcohol use: Not Currently     Comment: social    Drug use: Not Currently     Types: Marijuana, Cocaine     Comment: last used 1.5 weeks ago         Current Outpatient Medications:     buPROPion (Wellbutrin XL) 150 mg 24 hr tablet, Take 1 tablet (150 mg total) by mouth daily (Patient not taking: Reported on 2024), Disp: 30 tablet, Rfl: 3    cyclobenzaprine (FLEXERIL) 5 mg tablet, Take 1 tablet (5 mg total) by mouth daily at bedtime (Patient not taking: Reported on 2024), Disp: 30 tablet, Rfl: 0    EPINEPHrine (EPIPEN) 0.3 mg/0.3 mL SOAJ, Inject 0.3 mL (0.3 mg total) into a muscle once for 1 dose, Disp: 0.6 mL, Rfl: 0    ibuprofen (MOTRIN) 800 mg tablet, TAKE 1 TABLET (800 MG TOTAL) BY MOUTH EVERY 6 (SIX) HOURS AS NEEDED FOR MILD PAIN (PAIN SCORE 1-3). (Patient not taking: Reported on 2024), Disp: , Rfl: 1    methocarbamol (ROBAXIN) 500 mg tablet, TAKE 1 TABLET (500 MG TOTAL) BY MOUTH 4 (FOUR) TIMES A DAY AS NEEDED FOR MUSCLE SPASMS. (Patient not taking: Reported on 2024), Disp: , Rfl: 1    methylPREDNISolone 4 MG tablet therapy pack, Use as directed on package (Patient not taking: Reported on  "4/12/2024), Disp: 21 tablet, Rfl: 0    metroNIDAZOLE (FLAGYL) 500 mg tablet, Take 1 tablet by mouth 2 (two) times a day (Patient not taking: Reported on 4/12/2024), Disp: , Rfl:     naproxen (NAPROSYN) 500 mg tablet, Take 1 tablet (500 mg total) by mouth 2 (two) times a day with meals (Patient not taking: Reported on 4/12/2024), Disp: 20 tablet, Rfl: 0      OBJECTIVE:  Vitals:    05/30/24 0915   BP: 114/79   Pulse: 77   Weight: 80.7 kg (178 lb)   Height: 5' 6\" (1.676 m)       Physical Exam  GEN: The patient was alert and oriented x3, pleasant well-appearing female in no acute distress.   CV: Regular rate  PULM: Non-labored respirations  MSK: Normal gait  Skin: Warm, dry  Neuro: No focal deficits  Psych: Normal affect and judgement, cooperative      ASSESSMENT/PLAN:  Problem List       HSV (herpes simplex virus) anogenital infection    Human papillomavirus (HPV) type 18 DNA detected in cervical specimen    Overview     Pap Hx:  3/2016: NILM  3/2018: NILM  4/2024: NILM, HPV 18+  5/2024: colpo with benign biopsy x2 and ECC         Current Assessment & Plan     Patient counseled on results and instructed to have a pap with HPV testing in 1 year  Emphasized the importance of keeping up with her cervical cancer screening and likelihood of needing additional colpos or excisions in the future  Will administer 1st dose of Gardasil vaccine series today         Tobacco dependence    Current Assessment & Plan     Previously on Wellbutrin for both depression and tobacco cessation but stopped due to concern for false positive on probation drug testing  Referral placed to smoking cessation program  Feels mood is stable, has outpatient virtual therapist   Encouraged patient to also be seen by her PCP              Letha Bee MD   OBGYN PGY-3  05/30/24 9:46 AM    "

## 2024-05-30 NOTE — ASSESSMENT & PLAN NOTE
Patient counseled on results and instructed to have a pap with HPV testing in 1 year  Emphasized the importance of keeping up with her cervical cancer screening and likelihood of needing additional colpos or excisions in the future  Will administer 1st dose of Gardasil vaccine series today

## 2024-07-26 ENCOUNTER — TELEPHONE (OUTPATIENT)
Dept: OBGYN CLINIC | Facility: CLINIC | Age: 34
End: 2024-07-26

## 2024-07-26 NOTE — TELEPHONE ENCOUNTER
Pt called concern about 2nd HPV vaccine and if she still was on time for it to be administer, I address this to Attendee in office and I was advise by Dr. Murray and she confirmed that pt was still in window, appt was filemon.

## 2024-07-29 ENCOUNTER — CLINICAL SUPPORT (OUTPATIENT)
Dept: OBGYN CLINIC | Facility: CLINIC | Age: 34
End: 2024-07-29

## 2024-07-29 DIAGNOSIS — Z23 NEED FOR HPV VACCINE: Primary | ICD-10-CM

## 2024-07-29 PROCEDURE — 90651 9VHPV VACCINE 2/3 DOSE IM: CPT

## 2024-07-29 PROCEDURE — 90471 IMMUNIZATION ADMIN: CPT

## 2024-08-16 ENCOUNTER — TELEPHONE (OUTPATIENT)
Dept: OBGYN CLINIC | Facility: CLINIC | Age: 34
End: 2024-08-16

## 2024-08-16 NOTE — TELEPHONE ENCOUNTER
Pt called into the office stating that an episode of HSV outbreaks started yesterday and would like a refill on the medication acyclovir 500mg sent to the pharmacy in the system. Thank you

## 2024-08-17 DIAGNOSIS — A60.9 HSV (HERPES SIMPLEX VIRUS) ANOGENITAL INFECTION: Primary | ICD-10-CM

## 2024-08-17 RX ORDER — VALACYCLOVIR HYDROCHLORIDE 500 MG/1
500 TABLET, FILM COATED ORAL 2 TIMES DAILY
Qty: 20 TABLET | Refills: 0 | Status: SHIPPED | OUTPATIENT
Start: 2024-08-17 | End: 2024-08-22

## 2024-10-24 ENCOUNTER — OFFICE VISIT (OUTPATIENT)
Dept: OBGYN CLINIC | Facility: CLINIC | Age: 34
End: 2024-10-24

## 2024-10-24 ENCOUNTER — TELEPHONE (OUTPATIENT)
Dept: OBGYN CLINIC | Facility: CLINIC | Age: 34
End: 2024-10-24

## 2024-10-24 ENCOUNTER — APPOINTMENT (OUTPATIENT)
Dept: LAB | Facility: CLINIC | Age: 34
End: 2024-10-24
Payer: COMMERCIAL

## 2024-10-24 VITALS
SYSTOLIC BLOOD PRESSURE: 122 MMHG | HEIGHT: 66 IN | HEART RATE: 71 BPM | BODY MASS INDEX: 29.32 KG/M2 | DIASTOLIC BLOOD PRESSURE: 86 MMHG | WEIGHT: 182.4 LBS

## 2024-10-24 DIAGNOSIS — Z11.3 SCREEN FOR STD (SEXUALLY TRANSMITTED DISEASE): ICD-10-CM

## 2024-10-24 DIAGNOSIS — Z11.3 SCREEN FOR STD (SEXUALLY TRANSMITTED DISEASE): Primary | ICD-10-CM

## 2024-10-24 LAB
HBV SURFACE AG SER QL: NORMAL
HCV AB SER QL: NORMAL
HIV 1+2 AB+HIV1 P24 AG SERPL QL IA: NORMAL
HIV 2 AB SERPL QL IA: NORMAL
HIV1 AB SERPL QL IA: NORMAL
HIV1 P24 AG SERPL QL IA: NORMAL
TREPONEMA PALLIDUM IGG+IGM AB [PRESENCE] IN SERUM OR PLASMA BY IMMUNOASSAY: NORMAL

## 2024-10-24 PROCEDURE — 86803 HEPATITIS C AB TEST: CPT

## 2024-10-24 PROCEDURE — 87522 HEPATITIS C REVRS TRNSCRPJ: CPT

## 2024-10-24 PROCEDURE — 36415 COLL VENOUS BLD VENIPUNCTURE: CPT

## 2024-10-24 PROCEDURE — 87340 HEPATITIS B SURFACE AG IA: CPT

## 2024-10-24 PROCEDURE — 86780 TREPONEMA PALLIDUM: CPT

## 2024-10-24 PROCEDURE — 87491 CHLMYD TRACH DNA AMP PROBE: CPT | Performed by: NURSE PRACTITIONER

## 2024-10-24 PROCEDURE — 87591 N.GONORRHOEAE DNA AMP PROB: CPT | Performed by: NURSE PRACTITIONER

## 2024-10-24 PROCEDURE — 87389 HIV-1 AG W/HIV-1&-2 AB AG IA: CPT

## 2024-10-24 PROCEDURE — 87521 HEPATITIS C PROBE&RVRS TRNSC: CPT

## 2024-10-24 PROCEDURE — 99213 OFFICE O/P EST LOW 20 MIN: CPT | Performed by: NURSE PRACTITIONER

## 2024-10-24 NOTE — PROGRESS NOTES
PROBLEM GYNECOLOGICAL VISIT    Isabella De La Cruz is a 34 y.o. female who presents today for STI screening.  Her general medical history has been reviewed and she reports it as follows:    Past Medical History:   Diagnosis Date    ADHD (attention deficit hyperactivity disorder)     Asthma     HSV (herpes simplex virus) anogenital infection 2024    Human papillomavirus (HPV) type 18 DNA detected in cervical specimen 2024     Past Surgical History:   Procedure Laterality Date    WISDOM TOOTH EXTRACTION       OB History          1    Para        Term                AB   1    Living             SAB        IAB        Ectopic        Multiple        Live Births                   Social History     Tobacco Use    Smoking status: Former     Current packs/day: 0.50     Types: Cigarettes    Smokeless tobacco: Never    Tobacco comments:     per allscripts - former smoker    Vaping Use    Vaping status: Every Day    Substances: Nicotine, Flavoring   Substance Use Topics    Alcohol use: Not Currently     Comment: social    Drug use: Not Currently     Types: Marijuana, Cocaine     Comment: last used 1.5 weeks ago     Social History     Substance and Sexual Activity   Sexual Activity Yes    Birth control/protection: None       Current Outpatient Medications   Medication Instructions    buPROPion (WELLBUTRIN XL) 150 mg, Oral, Daily    cyclobenzaprine (FLEXERIL) 5 mg, Oral, Daily at bedtime    EPINEPHrine (EPIPEN) 0.3 mg, Intramuscular, Once    ibuprofen (MOTRIN) 800 mg tablet TAKE 1 TABLET (800 MG TOTAL) BY MOUTH EVERY 6 (SIX) HOURS AS NEEDED FOR MILD PAIN (PAIN SCORE 1-3).    methocarbamol (ROBAXIN) 500 mg tablet TAKE 1 TABLET (500 MG TOTAL) BY MOUTH 4 (FOUR) TIMES A DAY AS NEEDED FOR MUSCLE SPASMS.    methylPREDNISolone 4 MG tablet therapy pack Use as directed on package    metroNIDAZOLE (FLAGYL) 500 mg tablet 1 tablet, 2 times daily    naproxen (NAPROSYN) 500 mg, Oral, 2 times daily with meals     "valACYclovir (VALTREX) 500 mg, Oral, 2 times daily       History of Present Illness:   Isabella presents today for STI screening. She reports that she has been having unprotected intercourse with one male partner. She has a known history of HSV as does her partner. She also has a history of HPV, she has received two doses of the HPV vaccine and is scheduled for the final vaccine next month. She has no current STI symptoms.     Review of Systems:  Review of Systems   Constitutional: Negative.    Gastrointestinal: Negative.    Genitourinary: Negative.        Physical Exam:  /86 (BP Location: Right arm, Patient Position: Sitting)   Pulse 71   Ht 5' 6\" (1.676 m)   Wt 82.7 kg (182 lb 6.4 oz)   LMP 10/05/2024 (Exact Date)   BMI 29.44 kg/m²   Physical Exam  Constitutional:       General: She is not in acute distress.     Appearance: Normal appearance.   Genitourinary:      Vulva normal.      No lesions in the vagina.      No vaginal discharge, erythema, tenderness or ulceration.      No cervical lesion.   Neurological:      Mental Status: She is alert.   Skin:     General: Skin is warm and dry.   Psychiatric:         Mood and Affect: Mood normal.         Behavior: Behavior normal.   Vitals reviewed.         Assessment:   1. STI screening     Plan:   1. GC/CT culture collected.    2. Orders placed for serum HIV, hep B, hep C and syphilis screening    3. Return for final HPV vaccine next month    4. Patient's depression screening was assessed with a PHQ-2 score of 0. Clinically patient does not have depression. No treatment is required.      Reviewed with patient that [facility], but that they will not necessarily be reviewed by me immediately.  Explained that I will review results at my earliest opportunity and contact patient appropriately.  "

## 2024-10-24 NOTE — TELEPHONE ENCOUNTER
Pt was informed of her sti blood work being neg,all questions were answered, no further questions needed.

## 2024-10-24 NOTE — TELEPHONE ENCOUNTER
----- Message from IGOR Rob sent at 10/24/2024  2:01 PM EDT -----  Please let her know the sti blood testing is negative. Thank you!

## 2024-10-25 LAB
C TRACH DNA SPEC QL NAA+PROBE: NEGATIVE
HCV RNA SERPL NAA+PROBE-ACNC: NOT DETECTED K[IU]/ML
N GONORRHOEA DNA SPEC QL NAA+PROBE: NEGATIVE

## 2024-10-28 ENCOUNTER — TELEPHONE (OUTPATIENT)
Dept: OBGYN CLINIC | Facility: CLINIC | Age: 34
End: 2024-10-28

## 2024-10-28 NOTE — TELEPHONE ENCOUNTER
Called pt and lvm w/ office number for c/b.    ----- Message from IGOR Rob sent at 10/28/2024  8:41 AM EDT -----  Please let her know the sti culture is negative. Thank you!

## 2024-12-05 ENCOUNTER — CLINICAL SUPPORT (OUTPATIENT)
Dept: OBGYN CLINIC | Facility: CLINIC | Age: 34
End: 2024-12-05

## 2024-12-05 DIAGNOSIS — Z23 NEED FOR HPV VACCINATION: Primary | ICD-10-CM

## 2024-12-05 PROCEDURE — 90651 9VHPV VACCINE 2/3 DOSE IM: CPT

## 2024-12-05 PROCEDURE — 90471 IMMUNIZATION ADMIN: CPT

## 2025-03-07 ENCOUNTER — HOSPITAL ENCOUNTER (EMERGENCY)
Facility: HOSPITAL | Age: 35
Discharge: HOME | End: 2025-03-07
Attending: EMERGENCY MEDICINE
Payer: COMMERCIAL

## 2025-03-07 VITALS
SYSTOLIC BLOOD PRESSURE: 161 MMHG | TEMPERATURE: 97.8 F | HEART RATE: 98 BPM | DIASTOLIC BLOOD PRESSURE: 79 MMHG | OXYGEN SATURATION: 100 % | RESPIRATION RATE: 18 BRPM

## 2025-03-07 DIAGNOSIS — M79.605 LEFT LEG PAIN: Primary | ICD-10-CM

## 2025-03-07 DIAGNOSIS — M54.32 SCIATICA OF LEFT SIDE: ICD-10-CM

## 2025-03-07 PROCEDURE — 99283 EMERGENCY DEPT VISIT LOW MDM: CPT

## 2025-03-07 PROCEDURE — 99284 EMERGENCY DEPT VISIT MOD MDM: CPT | Performed by: EMERGENCY MEDICINE

## 2025-03-07 NOTE — ED PROVIDER NOTES
"Time reflects when diagnosis was documented in both MDM as applicable and the Disposition within this note       Time User Action Codes Description Comment    3/7/2025  6:18 AM IngridLeeroy Combs [M79.605] Left leg pain     3/7/2025  6:18 AM Leeroy Quintero Roberth [M54.32] Sciatica of left side           ED Disposition       ED Disposition   Discharge    Condition   Stable    Date/Time   Fri Mar 7, 2025  6:33 AM    Comment   Isabella De La Cruz discharge to home/self care.                   Assessment & Plan       Medical Decision Making  This patient presents with left leg pain most consistent with possible sciatica. Differential diagnoses includes lumbago versus musculoskeletal spasm / strain versus sciatica. Likely sciatica as straight leg raise test was positive. No back pain red flags on history or physical. Presentation not consistent with malignancy (lack of history of malignancy, lack of B symptoms), fracture (no trauma, no bony tenderness to palpation), cauda equina (no bowel or urinary incontinence/retention, no saddle anesthesia, no distal weakness), AAA, viscus perforation, osteomyelitis or epidural abscess (no IVDU, vertebral tenderness), renal colic, pyelonephritis (afebrile, no CVAT, no urinary symptoms). Given the clinical picture, no indication for imaging at this time.  Patient given walker for ambulatory assistance, patient deferred pain medication at this time, ambulatory referral to [facility]'s comprehensive spine program provided.  Patient in agreement with discharge and management plan, discharged home.             Medications - No data to display    ED Risk Strat Scores                                                History of Present Illness       Chief Complaint   Patient presents with    Leg Pain     Reports L leg pain x2 days. Reports slid off couch and hit L knee. No headstrike/LOC. No meds taken PTA. +tingling/pain. Pt reports \"concern for cancer or having to amputate L leg\" " "      Past Medical History:   Diagnosis Date    ADHD (attention deficit hyperactivity disorder)     Asthma     HSV (herpes simplex virus) anogenital infection 04/12/2024    Human papillomavirus (HPV) type 18 DNA detected in cervical specimen 04/12/2024      Past Surgical History:   Procedure Laterality Date    WISDOM TOOTH EXTRACTION        Family History   Problem Relation Age of Onset    Emphysema Mother     Dementia Mother     Lung cancer Mother     Hepatitis Father         C     Diabetes Family         due to underlying condition with diabetic mononeuropathy    Diabetes type II Family         without complication    Breast cancer Paternal Grandmother       Social History     Tobacco Use    Smoking status: Former     Current packs/day: 0.50     Types: Cigarettes    Smokeless tobacco: Never    Tobacco comments:     per allscripts - former smoker    Vaping Use    Vaping status: Every Day    Substances: Nicotine, Flavoring   Substance Use Topics    Alcohol use: Not Currently     Comment: social    Drug use: Not Currently     Types: Marijuana, Cocaine     Comment: last used 1.5 weeks ago      E-Cigarette/Vaping    E-Cigarette Use Current Every Day User       E-Cigarette/Vaping Substances    Nicotine Yes     THC No     CBD No     Flavoring Yes     Other No       I have reviewed and agree with the history as documented.     Pt is a 34 y.o. female who presents to the ED on March 7, 2025. Patient presents with progressive left-sided buttock and left leg pain that is now progressed to radiation below the knee toward the ankle.  Patient reports from home, drove herself here, after 2 days of progressive left buttock and leg pain that left her \"unable to get out of bed.\"  Patient reportedly slid off of her couch 2 days prior after attempting to do some home renovation, reportedly landing on her left feet without striking her knee, despite what triage reported.  Patient had no head strike, loss of consciousness.  Patient " "states that she does not take any pain medications, stating that she does well with being able to \"handle her pain.\"  Patient does not wish to be prescribed any pain medication.  Patient first noticed left buttock pain months ago, and states that symptoms have progressively worsened.  Denies any remitting or exacerbating factors.  Denies any loss of sensation, numbness, balance dysfunction.  Patient states that pain makes it difficult for her to ambulate.  Patient also states that she recently read an online article about a man that had a diagnosis of cancer after having persistent knee pain, which makes her concerned for underlying cancer or need to amputate her left leg.  Patient otherwise denies any significant weight loss, fever, night sweats, chills, chest pain, shortness of breath, nausea, vomiting, abdominal pain, suprapubic pain, neck pain, lower back pain, saddle anesthesia.  ROS otherwise negative.      Leg Pain      Review of Systems        Objective       ED Triage Vitals [03/07/25 0550]   Temperature Pulse Blood Pressure Respirations SpO2 Patient Position - Orthostatic VS   97.8 °F (36.6 °C) 98 161/79 18 100 % --      Temp Source Heart Rate Source BP Location FiO2 (%) Pain Score    Oral Monitor -- -- --      Vitals      Date and Time Temp Pulse SpO2 Resp BP Pain Score FACES Pain Rating User   03/07/25 0550 97.8 °F (36.6 °C) 98 100 % 18 161/79 -- -- AD            Physical Exam  Vitals and nursing note reviewed.   Constitutional:       General: She is not in acute distress.     Appearance: She is well-developed.   HENT:      Head: Normocephalic and atraumatic.   Eyes:      Conjunctiva/sclera: Conjunctivae normal.   Cardiovascular:      Rate and Rhythm: Normal rate and regular rhythm.      Heart sounds: No murmur heard.  Pulmonary:      Effort: Pulmonary effort is normal. No respiratory distress.      Breath sounds: Normal breath sounds.   Abdominal:      Palpations: Abdomen is soft.      Tenderness: There " is no abdominal tenderness.   Musculoskeletal:         General: No swelling.      Cervical back: Neck supple.      Comments: 5/5 strength with hip flexion and extension, knee flexion and extension, thigh abduction and adduction, dorsiflexion and plantarflexion of the ankles, inversion and eversion of the feet, and dorsiflexion of the toes bilaterally. Able to ambulate slowly due to pain. Intact sensation to light touch in the peripheral nerve distributions of the bilateral lower extremities. 2+ patellar and ankle reflexes bilaterally. No sustained ankle clonus. No saddle anesthesia.  No pain reproduced with axial load.  Pain reproducible along the lateral aspect of the left leg radiating down to the ankle with straight leg test.       Skin:     General: Skin is warm and dry.      Capillary Refill: Capillary refill takes less than 2 seconds.   Neurological:      Mental Status: She is alert.   Psychiatric:         Mood and Affect: Mood normal.         Results Reviewed       None            No orders to display       Procedures    ED Medication and Procedure Management   Prior to Admission Medications   Prescriptions Last Dose Informant Patient Reported? Taking?   EPINEPHrine (EPIPEN) 0.3 mg/0.3 mL SOAJ   No No   Sig: Inject 0.3 mL (0.3 mg total) into a muscle once for 1 dose   buPROPion (Wellbutrin XL) 150 mg 24 hr tablet   No No   Sig: Take 1 tablet (150 mg total) by mouth daily   Patient not taking: Reported on 5/30/2024   cyclobenzaprine (FLEXERIL) 5 mg tablet   No No   Sig: Take 1 tablet (5 mg total) by mouth daily at bedtime   Patient not taking: Reported on 4/12/2024   ibuprofen (MOTRIN) 800 mg tablet   Yes No   Sig: TAKE 1 TABLET (800 MG TOTAL) BY MOUTH EVERY 6 (SIX) HOURS AS NEEDED FOR MILD PAIN (PAIN SCORE 1-3).   Patient not taking: Reported on 4/12/2024   methocarbamol (ROBAXIN) 500 mg tablet   Yes No   Sig: TAKE 1 TABLET (500 MG TOTAL) BY MOUTH 4 (FOUR) TIMES A DAY AS NEEDED FOR MUSCLE SPASMS.   Patient  not taking: Reported on 4/12/2024   methylPREDNISolone 4 MG tablet therapy pack   No No   Sig: Use as directed on package   Patient not taking: Reported on 4/12/2024   metroNIDAZOLE (FLAGYL) 500 mg tablet   Yes No   Sig: Take 1 tablet by mouth 2 (two) times a day   Patient not taking: Reported on 4/12/2024   naproxen (NAPROSYN) 500 mg tablet   No No   Sig: Take 1 tablet (500 mg total) by mouth 2 (two) times a day with meals   Patient not taking: Reported on 4/12/2024   valACYclovir (VALTREX) 500 mg tablet   No No   Sig: Take 1 tablet (500 mg total) by mouth 2 (two) times a day for 5 days      Facility-Administered Medications: None     Discharge Medication List as of 3/7/2025  6:33 AM        CONTINUE these medications which have NOT CHANGED    Details   buPROPion (Wellbutrin XL) 150 mg 24 hr tablet Take 1 tablet (150 mg total) by mouth daily, Starting Tue 5/7/2024, Normal      cyclobenzaprine (FLEXERIL) 5 mg tablet Take 1 tablet (5 mg total) by mouth daily at bedtime, Starting Thu 6/6/2019, Normal      EPINEPHrine (EPIPEN) 0.3 mg/0.3 mL SOAJ Inject 0.3 mL (0.3 mg total) into a muscle once for 1 dose, Starting Mon 4/29/2024, Normal      ibuprofen (MOTRIN) 800 mg tablet TAKE 1 TABLET (800 MG TOTAL) BY MOUTH EVERY 6 (SIX) HOURS AS NEEDED FOR MILD PAIN (PAIN SCORE 1-3)., Historical Med      methocarbamol (ROBAXIN) 500 mg tablet TAKE 1 TABLET (500 MG TOTAL) BY MOUTH 4 (FOUR) TIMES A DAY AS NEEDED FOR MUSCLE SPASMS., Historical Med      methylPREDNISolone 4 MG tablet therapy pack Use as directed on package, Normal      metroNIDAZOLE (FLAGYL) 500 mg tablet Take 1 tablet by mouth 2 (two) times a day, Starting Fri 7/1/2016, Historical Med      naproxen (NAPROSYN) 500 mg tablet Take 1 tablet (500 mg total) by mouth 2 (two) times a day with meals, Starting Mon 12/17/2018, Print      valACYclovir (VALTREX) 500 mg tablet Take 1 tablet (500 mg total) by mouth 2 (two) times a day for 5 days, Starting Sat 8/17/2024, Until Thu  8/22/2024, Normal             ED SEPSIS DOCUMENTATION   Time reflects when diagnosis was documented in both MDM as applicable and the Disposition within this note       Time User Action Codes Description Comment    3/7/2025  6:18 AM Leeroy Quintero [M79.605] Left leg pain     3/7/2025  6:18 AM Leeroy Quintero [M54.32] Sciatica of left side                  Leeroy Quintero MD  03/07/25 0648

## 2025-03-07 NOTE — Clinical Note
Isabella De La Cruz was seen and treated in our emergency department on 3/7/2025.                Diagnosis:     Isabella  may return to work on return date.    She may return on this date: 03/09/2025         If you have any questions or concerns, please don't hesitate to call.      Leeroy Quintero MD    ______________________________           _______________          _______________  Hospital Representative                              Date                                Time

## 2025-03-10 ENCOUNTER — NURSE TRIAGE (OUTPATIENT)
Dept: PHYSICAL THERAPY | Facility: OTHER | Age: 35
End: 2025-03-10

## 2025-03-10 ENCOUNTER — TELEPHONE (OUTPATIENT)
Dept: PHYSICAL THERAPY | Facility: OTHER | Age: 35
End: 2025-03-10

## 2025-03-10 NOTE — TELEPHONE ENCOUNTER
"Additional Information   Negative: Is this related to a work injury?   Negative: Is this related to an MVA?   Negative: Are you currently recieving homecare services?    Background - Initial Assessment  Clinical complaint: Pain is left low back, buttock area, down left leg to the foot. Left knee to ankle most painful.NKI started \"a few weeks ago\". Did have a few falls on ice this year, and states she also fell off her couch recently.  States right buttock started hurting this morning. Has tingling in right toes and left calf to foot. No numbness currently. Pt states she went to answer her phone today and she had sudden shooting  pain in neck down bilat shoulders and back. This pain is also new. No prior neck or back SX. Pain is constant but tolerable. Feels aching, dull and at times sharp. If standing too long must hold onto something for help. Seen in ED 3/7/25  Date of onset: \"a couple weeks\"  Frequency of pain: constant  Quality of pain: aching, dull, and sharp    Protocols used: [facility] Protocol    "

## 2025-03-10 NOTE — TELEPHONE ENCOUNTER
This nurse called the patient to proceed with the triage initiated earlier today.   Message left stating reason for call, [facility] contact information, and hours of operation.    Encouraged patient to CB if she would like to complete the triage and referral process.    Referral closed per protocol and will await possible call-back from the patient.     Note: Patient c/o new s/s to CS staff. Patient stated she had sudden onset of neck & shoulder pain today. Please see prior CS encounter. Patient may RTED for evaluation.

## 2025-03-10 NOTE — ED ATTENDING ATTESTATION
3/7/2025  I, Tobin Bro MD, saw and evaluated the patient. I have discussed the patient with the resident/non-physician practitioner and agree with the resident's/non-physician practitioner's findings, Plan of Care, and MDM as documented in the resident's/non-physician practitioner's note, except where noted. All available labs and Radiology studies were reviewed.  I was present for key portions of any procedure(s) performed by the resident/non-physician practitioner and I was immediately available to provide assistance.       At this point I agree with the current assessment done in the Emergency Department.  I have conducted an independent evaluation of this patient a history and physical is as follows:see h and p above- agree with er resident tx plan / dispo     ED Course  ED Course as of 03/10/25 0646   Fri Mar 07, 2025   0633 Er md note- pt offered left knee xray -- refuses at this point- asking for a work note to go back to work on Sunday and a referral for spine and pain- does no twant any medication s   0635 Er md note-           Critical Care Time  Procedures

## 2025-03-13 ENCOUNTER — TELEPHONE (OUTPATIENT)
Dept: FAMILY MEDICINE CLINIC | Facility: CLINIC | Age: 35
End: 2025-03-13

## 2025-03-13 ENCOUNTER — OFFICE VISIT (OUTPATIENT)
Dept: OBGYN CLINIC | Facility: CLINIC | Age: 35
End: 2025-03-13

## 2025-03-13 VITALS
HEIGHT: 66 IN | WEIGHT: 169.6 LBS | SYSTOLIC BLOOD PRESSURE: 131 MMHG | DIASTOLIC BLOOD PRESSURE: 97 MMHG | BODY MASS INDEX: 27.26 KG/M2 | HEART RATE: 85 BPM

## 2025-03-13 DIAGNOSIS — N76.0 BV (BACTERIAL VAGINOSIS): ICD-10-CM

## 2025-03-13 DIAGNOSIS — Z11.3 SCREEN FOR STD (SEXUALLY TRANSMITTED DISEASE): Primary | ICD-10-CM

## 2025-03-13 DIAGNOSIS — B96.89 BV (BACTERIAL VAGINOSIS): ICD-10-CM

## 2025-03-13 DIAGNOSIS — Z13.31 POSITIVE DEPRESSION SCREENING: ICD-10-CM

## 2025-03-13 LAB
BV WHIFF TEST VAG QL: POSITIVE
CLUE CELLS SPEC QL WET PREP: POSITIVE
PH SMN: 5.5 [PH]
SL AMB POCT WET MOUNT: ABNORMAL
T VAGINALIS VAG QL WET PREP: NEGATIVE
YEAST VAG QL WET PREP: NEGATIVE

## 2025-03-13 PROCEDURE — 87591 N.GONORRHOEAE DNA AMP PROB: CPT | Performed by: NURSE PRACTITIONER

## 2025-03-13 PROCEDURE — 87210 SMEAR WET MOUNT SALINE/INK: CPT | Performed by: NURSE PRACTITIONER

## 2025-03-13 PROCEDURE — 87491 CHLMYD TRACH DNA AMP PROBE: CPT | Performed by: NURSE PRACTITIONER

## 2025-03-13 PROCEDURE — 99213 OFFICE O/P EST LOW 20 MIN: CPT | Performed by: NURSE PRACTITIONER

## 2025-03-13 RX ORDER — METRONIDAZOLE 500 MG/1
500 TABLET ORAL 2 TIMES DAILY
Qty: 14 TABLET | Refills: 0 | Status: SHIPPED | OUTPATIENT
Start: 2025-03-13 | End: 2025-03-20

## 2025-03-13 NOTE — TELEPHONE ENCOUNTER
RAVINDER bhatt on the Clinical line (3/13/25 11:18Am)    Hi, this is Isabella De La Cruz. I'm calling because I'm trying to find a doctor to go to for my mental health and they sent me a list and this was one of the places on the list and I was wondering if I can get in at a earlier appointment because I'm kind of struggling right now with depression and I need to see somebody. If you can please give me a call back. My number is [phone]. Again, that's [phone]. Thank you and have a nice day.

## 2025-03-13 NOTE — TELEPHONE ENCOUNTER
Returned call and LVM, Informing this is a family practice if that is something she is looking for, provided call back number,
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
## 2025-03-14 ENCOUNTER — PATIENT OUTREACH (OUTPATIENT)
Dept: OBGYN CLINIC | Facility: CLINIC | Age: 35
End: 2025-03-14

## 2025-03-14 ENCOUNTER — RESULTS FOLLOW-UP (OUTPATIENT)
Dept: OBGYN CLINIC | Facility: CLINIC | Age: 35
End: 2025-03-14

## 2025-03-14 LAB
C TRACH DNA SPEC QL NAA+PROBE: NEGATIVE
N GONORRHOEA DNA SPEC QL NAA+PROBE: NEGATIVE
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
## 2025-03-14 NOTE — TELEPHONE ENCOUNTER
Pt called back into comp spine to complete triage for PT    RN was on her break at the time the call came in. Teams message sent to RN to call Pt back when available to assist in completion.

## 2025-03-14 NOTE — TELEPHONE ENCOUNTER
----- Message from IGOR Rob sent at 3/14/2025  2:51 PM EDT -----  Please let her know the sti culture is negative. Thank you!      Left a vm informing the pt to call the office for her neg sti culture results, c/b# was provided.       Pt called office back and was informed of her neg sti culture results.

## 2025-03-17 NOTE — TELEPHONE ENCOUNTER
This nurse was notified of the patients call to  Friday afternoon. Nurse called the patient to proceed with her triage.  Message left thanking her for her patience.  This nurse called the patient to proceed with the triage initiated on 3/10/2025.   Message left stating reason for the call, [facility] contact information, and current hours of operation.    Encouraged the patient to CB if she would like to complete the triage and referral process.    Referral closed per protocol and will await possible call-back from the patient.

## 2025-05-04 ENCOUNTER — HOSPITAL ENCOUNTER (EMERGENCY)
Facility: HOSPITAL | Age: 35
Discharge: HOME | End: 2025-05-04
Attending: EMERGENCY MEDICINE

## 2025-05-04 VITALS
DIASTOLIC BLOOD PRESSURE: 118 MMHG | OXYGEN SATURATION: 98 % | HEART RATE: 78 BPM | TEMPERATURE: 98.3 F | RESPIRATION RATE: 20 BRPM | SYSTOLIC BLOOD PRESSURE: 171 MMHG

## 2025-05-04 DIAGNOSIS — R03.0 ELEVATED BLOOD PRESSURE READING: Primary | ICD-10-CM

## 2025-05-04 DIAGNOSIS — F31.9 BIPOLAR 1 DISORDER (HCC): ICD-10-CM

## 2025-05-04 PROCEDURE — 99282 EMERGENCY DEPT VISIT SF MDM: CPT

## 2025-05-04 PROCEDURE — 99283 EMERGENCY DEPT VISIT LOW MDM: CPT | Performed by: EMERGENCY MEDICINE
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
## 2025-05-05 NOTE — DISCHARGE INSTRUCTIONS
Your blood pressure was elevated in the emergency department today.  You should make an appointment with your doctor in the next 1 week for follow-up and recheck of the blood pressure.

## 2025-05-05 NOTE — ED ATTENDING ATTESTATION
5/4/2025  I, Krunal Urbina DO, saw and evaluated the patient. I have discussed the patient with the resident/non-physician practitioner and agree with the resident's/non-physician practitioner's findings, Plan of Care, and MDM as documented in the resident's/non-physician practitioner's note, except where noted. All available labs and Radiology studies were reviewed.  I was present for key portions of any procedure(s) performed by the resident/non-physician practitioner and I was immediately available to provide assistance.       At this point I agree with the current assessment done in the Emergency Department.  I have conducted an independent evaluation of this patient a history and physical is as follows:    Patient is a 34-year-old female with history of bipolar disorder, asthma, ADHD, says that she just broke up with her boyfriend today, she went to the police department to report concerns that she had regarding his behavior.  She says she felt safe, but was concerned about several things she did not want to go into.  She says while at the police department, the officer there was concerned that she may be a little bit manic so recommended she come to the ED to be evaluated.  She says she recognizes that she is manic, knows she has bipolar, but thinks that she is well enough controlled that she does not need her lithium which had been previously prescribed but she has not taken for about 10 years.  She denies any physical complaints right now, denies SI, denies HI, denies AVH, denies chest pain, no shortness of breath, no fevers, denies chills.  No visual changes, no urinary symptoms says she is anxious about being here in the hospital because she does not like being in the hospitals, and just wants to go home.  She says that she has a safe place to stay and she has been able to take care of activities of daily without difficulty.    General:  Patient is well-appearing  Head:  Atraumatic  Eyes:  Conjunctiva  pink  ENT:  Mucous membranes are moist  Neck:  Supple  Cardiac:  S1-S2, without murmurs  Lungs:  Clear to auscultation bilaterally  Abdomen:  Soft, nontender, normal bowel sounds, no CVA tenderness, no tympany, no rigidity, no guarding  Extremities:  Normal range of motion  Neurologic:  Awake, fluent speech, normal comprehension. AAOx3.   Skin:  Pink warm and dry  Psychiatric:  Appearance: Casually dressed, appears well-kept; Speech: Very pressured; Mood: Slightly anxious; Affect: Congruent; Thought Process: answers questions appropriately and responds goal-directed but then begins having tangential thinking again; Thought Content: Denies SI, HI or AVH          ED Course     Patient presents for evaluation, she does appear to be manic, however no SI, no HI, appears to be well, says she has no problems with activities a living, and is not exhibiting any behavior which would suggest that she is a danger to herself or others or in need of involuntary hospitalization.  She does not want inpatient psychiatric care and does not think she wants to follow-up with outpatient psych but she was given information regarding this.While the patient has been hypertensive in the emergency department, there is no evidence of hypertensive emergency, no evidence of end-organ damage on exam or per history. I do not believe the patient requires emergent lowering of their blood pressure. I considered obtaining laboratory studies however I believe it is reasonable for the patient to be discharged and have outpatient follow up for reassessment of their blood pressure, and if it is still elevated, for additional decisions regarding their management to be made by a primary care physician. I did discuss with the patient their elevated blood pressure reading, the importance of follow up for their elevated blood pressure reading, and the risks associated with untreated hypertension.      IMPRESSIONS:  Bipolar with perry, encounter for  psychiatric evaluation, elevated blood pressure reading    MEDICAL DECISION MAKING CODING    COLLECTION AND INTERPRETATION OF DATA  I reviewed prior external notes, including March 13, 2025 OB/GYN office visit      Critical Care Time  Procedures